# Patient Record
Sex: MALE | Race: WHITE | Employment: OTHER | URBAN - METROPOLITAN AREA
[De-identification: names, ages, dates, MRNs, and addresses within clinical notes are randomized per-mention and may not be internally consistent; named-entity substitution may affect disease eponyms.]

---

## 2019-12-03 ENCOUNTER — OFFICE VISIT (OUTPATIENT)
Dept: ORTHOPEDIC SURGERY | Age: 74
End: 2019-12-03
Payer: MEDICARE

## 2019-12-03 VITALS
SYSTOLIC BLOOD PRESSURE: 123 MMHG | WEIGHT: 180 LBS | HEIGHT: 72 IN | HEART RATE: 72 BPM | DIASTOLIC BLOOD PRESSURE: 86 MMHG | BODY MASS INDEX: 24.38 KG/M2

## 2019-12-03 DIAGNOSIS — M25.562 PAIN IN BOTH KNEES, UNSPECIFIED CHRONICITY: ICD-10-CM

## 2019-12-03 DIAGNOSIS — M17.0 BILATERAL PRIMARY OSTEOARTHRITIS OF KNEE: Primary | ICD-10-CM

## 2019-12-03 DIAGNOSIS — M25.561 PAIN IN BOTH KNEES, UNSPECIFIED CHRONICITY: ICD-10-CM

## 2019-12-03 PROCEDURE — 99204 OFFICE O/P NEW MOD 45 MIN: CPT | Performed by: ORTHOPAEDIC SURGERY

## 2019-12-03 SDOH — HEALTH STABILITY: MENTAL HEALTH: HOW OFTEN DO YOU HAVE A DRINK CONTAINING ALCOHOL?: NEVER

## 2019-12-03 ASSESSMENT — ENCOUNTER SYMPTOMS
ALLERGIC/IMMUNOLOGIC NEGATIVE: 1
RESPIRATORY NEGATIVE: 1
GASTROINTESTINAL NEGATIVE: 1
EYES NEGATIVE: 1

## 2019-12-05 PROBLEM — M17.0 BILATERAL PRIMARY OSTEOARTHRITIS OF KNEE: Status: ACTIVE | Noted: 2019-12-05

## 2019-12-16 DIAGNOSIS — M17.12 PRIMARY OSTEOARTHRITIS OF LEFT KNEE: Primary | ICD-10-CM

## 2019-12-19 ENCOUNTER — TELEPHONE (OUTPATIENT)
Dept: ORTHOPEDIC SURGERY | Age: 74
End: 2019-12-19

## 2019-12-20 ENCOUNTER — HOSPITAL ENCOUNTER (OUTPATIENT)
Dept: GENERAL RADIOLOGY | Age: 74
Discharge: HOME | End: 2019-12-20
Payer: MEDICARE

## 2019-12-20 ENCOUNTER — HOSPITAL ENCOUNTER (OUTPATIENT)
Age: 74
Discharge: HOME | End: 2019-12-20
Payer: MEDICARE

## 2019-12-20 DIAGNOSIS — M17.12 PRIMARY OSTEOARTHRITIS OF LEFT KNEE: ICD-10-CM

## 2019-12-20 PROCEDURE — 77073 BONE LENGTH STUDIES: CPT

## 2019-12-30 NOTE — PROGRESS NOTES
effect during this procedure. I give my doctor permission to give me blood or blood products. I understand that there are risks with receiving blood such as hepatitis, AIDS, fever, or allergic reaction. I acknowledge that the risks, benefits, and alternatives of this treatment have been explained to me and that no express or implied warranty has been given by the hospital, any blood bank, or any person or entity as to the blood or blood components transfused. At the discretion of my doctor, I agree to allow observers, equipment/product representatives and allow photographing, and/or televising of the procedure, provided my name or identity is maintained confidentially. I agree the hospital may dispose of or use for scientific or educational purposes any tissue, fluid, or body parts which may be removed.     ________________________________Date________Time______ am/pm  ([facility])  Patient or Signature of Closest Relative or Legal Guardian    ________________________________Date________Time______am/pm      Page 1 of  1  Witness

## 2020-01-10 NOTE — PROGRESS NOTES
procedure. 13. Bring cases for your glasses, contacts, dentures, or hearing aids to protect them while you are in surgery. 16. If you use a CPAP, please bring it with you on the day of your procedure. 17. Do not shave or wax for 72 hours prior to procedure near your operative site  18. FOR WOMAN OF CHILDBEARING AGE ONLY- please bring a urine sample with you on day of surgery or make sure we can collect on arrival.    If you have further questions, you may contact your surgeon's office or us at [phone]    Left instructions on patient's voicemail. Mis Harris. 1/10/2020 .2:21 PM

## 2020-01-13 RX ORDER — COLLAGENASE CLOSTRIDIUM HIST.
POWDER (EA) MISCELLANEOUS DAILY
COMMUNITY

## 2020-01-14 ENCOUNTER — HOSPITAL ENCOUNTER (OUTPATIENT)
Dept: PHYSICAL THERAPY | Age: 75
Setting detail: THERAPIES SERIES
Discharge: HOME | End: 2020-01-14
Payer: MEDICARE

## 2020-01-14 ENCOUNTER — OFFICE VISIT (OUTPATIENT)
Dept: ORTHOPEDIC SURGERY | Age: 75
End: 2020-01-14

## 2020-01-14 VITALS
DIASTOLIC BLOOD PRESSURE: 82 MMHG | HEART RATE: 57 BPM | WEIGHT: 179.9 LBS | SYSTOLIC BLOOD PRESSURE: 131 MMHG | HEIGHT: 73 IN | BODY MASS INDEX: 23.84 KG/M2

## 2020-01-14 PROCEDURE — 1036F TOBACCO NON-USER: CPT | Performed by: ORTHOPAEDIC SURGERY

## 2020-01-14 PROCEDURE — 99213 OFFICE O/P EST LOW 20 MIN: CPT | Performed by: ORTHOPAEDIC SURGERY

## 2020-01-14 PROCEDURE — G8427 DOCREV CUR MEDS BY ELIG CLIN: HCPCS | Performed by: ORTHOPAEDIC SURGERY

## 2020-01-14 PROCEDURE — MISC250 COMPRESSION STOCKING: Performed by: ORTHOPAEDIC SURGERY

## 2020-01-14 PROCEDURE — 97162 PT EVAL MOD COMPLEX 30 MIN: CPT

## 2020-01-14 PROCEDURE — 97110 THERAPEUTIC EXERCISES: CPT

## 2020-01-14 PROCEDURE — G8420 CALC BMI NORM PARAMETERS: HCPCS | Performed by: ORTHOPAEDIC SURGERY

## 2020-01-14 PROCEDURE — 1123F ACP DISCUSS/DSCN MKR DOCD: CPT | Performed by: ORTHOPAEDIC SURGERY

## 2020-01-14 PROCEDURE — 3017F COLORECTAL CA SCREEN DOC REV: CPT | Performed by: ORTHOPAEDIC SURGERY

## 2020-01-14 PROCEDURE — 97530 THERAPEUTIC ACTIVITIES: CPT

## 2020-01-14 PROCEDURE — G8484 FLU IMMUNIZE NO ADMIN: HCPCS | Performed by: ORTHOPAEDIC SURGERY

## 2020-01-14 PROCEDURE — 4040F PNEUMOC VAC/ADMIN/RCVD: CPT | Performed by: ORTHOPAEDIC SURGERY

## 2020-01-14 NOTE — FLOWSHEET NOTE
[facility] [address] [place] [address] [facility]    Physical Therapy Daily Treatment Note  Date:  2020    Patient Name:  Welby Bloch    :  1945  MRN: [id number]  Restrictions/Precautions:    Medical/Treatment Diagnosis Information:  · Diagnosis: M17.12 (ICD-10-CM) - Primary osteoarthritis of left knee  · Treatment Diagnosis: M25.562 L Knee Pain; M25.662 L Knee Stiffness; R53.1 Weakness  · Pre-op Left TKR  · DOS: 1-15-20  · Medications: update PO  Insurance/Certification information:  PT Insurance Information: PT BENEFITS  FACILITY/ MEDICARE PRIMARY/ EFFECTIVE 16/ ACTIVE/ PAYS 80%/ NO VISIT LIMIT MED NEC/ 0 AUTH/ [employer] SECONDARY INS/ 20 PAG  Physician Information:  Referring Practitioner: Augustine Sherman  Has the plan of care been signed (Y/N):        []  Yes  [x]  No     Date of Patient follow up with Physician: 1,3,6,12 weeks PO  Patient seen in consultation with Dr. Augustine Sherman who established the initial/subsequent treatment protocol. Is this a Progress Report:     []  Yes  [x]  No     If Yes:  Date Range for reporting period:  Beginning  Ending    Progress report will be due (10 Rx or 30 days whichever is less):        Recertification will be due (POC Duration  / 90 days whichever is less): 20       Visit # Insurance Allowable Auth Required   1 MEDICARE []  Yes [x]  No      OUTCOME MEASURE DATE DEFICIT   LEFS 20 pre-op 41% Deficit        Patient given satisfaction survey?  [] Yes   [x] No       Latex Allergy:  [x]NO      []YES  Preferred Language for Healthcare:   [x]English       []other:    Pain level:  0-8/10     SUBJECTIVE:  See eval    OBJECTIVE:       20  ROM PROM AROM Overpressure Comment     L R L R L R     Flexion     115 130         Extension     +5 +5                                                20  Strength L R Comment   Quad 5 5     Hamstring 5 5     Gastroc NT NT     Hip  flexion 4+ 4+     Hip abd NT NT                       have access to gym? [] Yes  [x] No  Patient have equipment at home? [] Yes  [x] No       Patient Education:  1/14/20: Educated patient on all pre-op and post-op instructions including but not limited to R.I.C.E., post-op HEP, DVT prevention, warning signs of infection and DVT, activity limitations. HEP:  Patient instructed on HEP on this date with handout provided and all questions answered. Patient was instructed to contact PT with any complications or questions about HEP moving forward. Patient verbally stated he understood. Updated 1/14/20    Therapeutic Exercise and NMR EXR  [x] (97826) Provided verbal/tactile cueing for activities related to strengthening, flexibility, endurance, ROM for improvements in LE, proximal hip, and core control with self care, mobility, lifting, ambulation. [x] (56503) Provided verbal/tactile cueing for activities related to improving balance, coordination, kinesthetic sense, posture, motor skill, proprioception  to assist with LE, proximal hip, and core control in self care, mobility, lifting, ambulation and eccentric single leg control.      NMR and Therapeutic Activities:    [x] (81056 or 05526) Provided verbal/tactile cueing for activities related to improving balance, coordination, kinesthetic sense, posture, motor skill, proprioception and motor activation to allow for proper function of core, proximal hip and LE with self care and ADLs  [] (90163) Gait Re-education- Provided training and instruction to the patient for proper LE, core and proximal hip recruitment and positioning and eccentric body weight control with ambulation re-education including up and down stairs     Home Exercise Program:    [x] (24458) Reviewed/Progressed HEP activities related to strengthening, flexibility, endurance, ROM of core, proximal hip and LE for functional self-care, mobility, lifting and ambulation/stair navigation   [] (79038)Reviewed/Progressed HEP activities related to improving balance, coordination, kinesthetic sense, posture, motor skill, proprioception of core, proximal hip and LE for self care, mobility, lifting, and ambulation/stair navigation      Manual Treatments:  PROM / STM / Oscillations-Mobs:  G-I, II, III, IV (PA's, Inf., Post.)  [x] (71438) Provided manual therapy to mobilize LE, proximal hip and/or LS spine soft tissue/joints for the purpose of modulating pain, promoting relaxation,  increasing ROM, reducing/eliminating soft tissue swelling/inflammation/restriction, improving soft tissue extensibility and allowing for proper ROM for normal function with self care, mobility, lifting and ambulation. Modalities:  N/A    Charges:  Timed Code Treatment Minutes: 25'   Total Treatment Minutes: 61'       [] EVAL (LOW) 455 1011 (typically 20 minutes face-to-face)  [x] EVAL (MOD) 77656 (typically 30 minutes face-to-face)  [] EVAL (HIGH) 98624 (typically 45 minutes face-to-face)  [] RE-EVAL   [x] HW(23149) x 1    [] IONTO  [] NMR (36031) x     [] VASO  [] Manual (62351) x      [] Other:  [x] TA x 1     [] Mech Traction (28904)  [] ES(attended) (24611)      [] ES (un) (00516):     GOALS:   Patient stated goal: Reduce pain in knee with ADL's    [] Progressing: [] Met: [] Not Met: [] Adjusted    Therapist goals for Patient:   Short Term Goals: To be achieved in: 2 weeks  1. Independent in HEP and progression per patient tolerance, in order to prevent re-injury. [] Progressing: [] Met: [] Not Met: [] Adjusted   2. Patient will have a decrease in pain to facilitate improvement in movement, function, and ADLs as indicated by Functional Deficits. [] Progressing: [] Met: [] Not Met: [] Adjusted    Long Term Goals: To be achieved in: 12 weeks  1. Disability index score of 30% or less for the LEFS to assist with reaching prior level of function. [] Progressing: [] Met: [] Not Met: [] Adjusted  2.  Patient will demonstrate increased AROM to 0-120 or greater to allow for proper joint functioning

## 2020-01-14 NOTE — PLAN OF CARE
is affecting his quality of life. 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 Pain is located along medial and lateral joint lines and is sharp in nature. Lives ~3 hours away in [place].      (-) clicking/popping/crepitus  (-) swelling  (-) Stiffness  (-) numbness and tingling  (+) giving way- feels like \"it wants to hyperextend\"  (-) locking up  (-) night pain  (+) pain with stairs ascending/descending  (-) Difficulty with balance  (-) Unexplained weight loss      Relevant Medical History:x2 previous left knee surgeries  Functional Disability Index:   OUTCOME MEASURE DATE DEFICIT   LEFS 1/14/20 Pre-op 41% Deficit           Pain Scale: 0/10 at rest,  7-8/10 at worst  Easing factors: Stopping / modifying activity;   Provocative factors: Lying on his side at night, walking downhill or on an incline, getting in/out of car    Type: []Constant   [x]Intermittent  []Radiating [x]Localized []other:     Numbness/Tingling: Denies    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    Living Status/Prior Level of Function: Independent with ADLs and IADLs;     OBJECTIVE:      1/14/20  ROM PROM AROM Overpressure Comment    L R L R L R    Flexion   115 130      Extension   +5 +5                            1/14/20  Strength L R Comment   Quad 5 5    Hamstring 5 5    Gastroc NT NT    Hip  flexion 4+ 4+    Hip abd NT NT                  1/14/20  Special Test Results/Comment   Homans Neg   Temperature Update PO     1/14/20  Girth L R   Mid Patella 39.2 37.0   Suprapatellar 37.5 37.5   5cm above NT - clothing NT - clothing   15cm above       Reflexes/Sensation:    [x]Dermatomes/Myotomes intact    [x]Reflexes equal and normal bilaterally   []Other:    Joint mobility:     [x]Normal    []Hypo   []Hyper    Palpation: No TTP; noticeable bone spur palpable around MJL    Functional Mobility/Transfers: Ind    Posture: Varus    Bandages/Dressings/Incisions: update PO    Gait: (include devices/WB status) Mild antalgic on however, he does live/work on a farm which may provide limitations during his recovery    Falls Risk Assessment (30 days):   [x] Falls Risk assessed and no intervention required. [] Falls Risk assessed and Patient requires intervention due to being higher risk   TUG score (>12s at risk):     [] Falls education provided, including       G-Codes:     OUTCOME MEASURE DATE DEFICIT   LEFS 1/14/20 Pre-op 41% Deficit          ASSESSMENT:   Functional Impairments:     []Noted lumbar/proximal hip/LE hypomobility   [x]Decreased LE functional ROM   [x]Decreased core/proximal hip strength and neuromuscular control   [x]Decreased LE functional strength   [x]Reduced balance/proprioceptive control   []other:      Functional Activity Limitations (from functional questionnaire and intake)   [x]Reduced ability to tolerate prolonged functional positions   [x]Reduced ability or difficulty with changes of positions or transfers between positions   [x]Reduced ability to maintain good posture and demonstrate good body mechanics with sitting, bending, and lifting   [x]Reduced ability to sleep   [x] Reduced ability or tolerance with driving and/or computer work   [x]Reduced ability to perform lifting, carrying tasks   [x]Reduced ability to squat   []Reduced ability to forward bend   [x]Reduced ability to ambulate prolonged functional periods/distances/surfaces   [x]Reduced ability to ascend/descend stairs   [x]Reduced ability to run, hop or jump   []other:     Participation Restrictions   [x]Reduced participation in self care activities   [x]Reduced participation in home management activities   [x]Reduced participation in work activities   []Reduced participation in social activities. []Reduced participation in sport activities. Classification :    [x]Signs/symptoms consistent with post-surgical status including decreased ROM, strength and function.  - will be PO at NPV   []Signs/symptoms consistent with joint sprain/strain   []Signs/symptoms consistent with patella-femoral syndrome   [x]Signs/symptoms consistent with knee OA/hip OA   []Signs/symptoms consistent with internal derangement of knee/Hip   []Signs/symptoms consistent with functional hip weakness/NMR control      []Signs/symptoms consistent with tendinitis/tendinosis    []signs/symptoms consistent with pathology which may benefit from Dry needling      []other:      Prognosis/Rehab Potential:      []Excellent   [x]Good    []Fair   []Poor    Tolerance of evaluation/treatment:    []Excellent   [x]Good    []Fair   []Poor    Physical Therapy Evaluation Complexity Justification  [x] A history of present problem with:  [] no personal factors and/or comorbidities that impact the plan of care;  [x]1-2 personal factors and/or comorbidities that impact the plan of care  []3 personal factors and/or comorbidities that impact the plan of care  [x] An examination of body systems using standardized tests and measures addressing any of the following: body structures and functions (impairments), activity limitations, and/or participation restrictions;:  [] a total of 1-2 or more elements   [] a total of 3 or more elements   [x] a total of 4 or more elements   [x] A clinical presentation with:  [] stable and/or uncomplicated characteristics   [x] evolving clinical presentation with changing characteristics  [] unstable and unpredictable characteristics;   [x] Clinical decision making of [] low, [x] moderate, [] high complexity using standardized patient assessment instrument and/or measurable assessment of functional outcome.     [] EVAL (LOW) 41291 (typically 20 minutes face-to-face)  [x] EVAL (MOD) 99257 (typically 30 minutes face-to-face)  [] EVAL (HIGH) 37683 (typically 45 minutes face-to-face)  [] RE-EVAL       PLAN  Frequency/Duration:  2 days per week for 12 Weeks:  Interventions:  [x]  Therapeutic exercise including: strength training, ROM, for Lower extremity and core [x]  NMR activation and proprioception for LE, Glutes and Core   [x]  Manual therapy as indicated for LE, Hip and spine to include: Dry Needling/IASTM, STM, PROM, Gr I-IV mobilizations, manipulation. [x] Modalities as needed that may include: thermal agents, E-stim, Biofeedback, US, iontophoresis as indicated  [x] Patient education on joint protection, postural re-education, activity modification, progression of HEP. HEP instruction:  Patient instructed on HEP on this date with handout provided and all questions answered. Patient was instructed to contact PT with any complications or questions about HEP moving forward. Patient verbally stated he understood. (see scanned forms)    GOALS:   Patient stated goal: Reduce pain in knee with ADL's    [] Progressing: [] Met: [] Not Met: [] Adjusted    Therapist goals for Patient:   Short Term Goals: To be achieved in: 2 weeks  1. Independent in HEP and progression per patient tolerance, in order to prevent re-injury. [] Progressing: [] Met: [] Not Met: [] Adjusted   2. Patient will have a decrease in pain to facilitate improvement in movement, function, and ADLs as indicated by Functional Deficits. [] Progressing: [] Met: [] Not Met: [] Adjusted    Long Term Goals: To be achieved in: 12 weeks  1. Disability index score of 30% or less for the LEFS to assist with reaching prior level of function. [] Progressing: [] Met: [] Not Met: [] Adjusted  2. Patient will demonstrate increased AROM to 0-120 or greater to allow for proper joint functioning as indicated by patients Functional Deficits. [] Progressing: [] Met: [] Not Met: [] Adjusted  3. Patient will demonstrate an increase in Strength to 5/5 in BLE to allow for proper functional mobility as indicated by patients Functional Deficits. [] Progressing: [] Met: [] Not Met: [] Adjusted  4. Patient will return to all ADL and other functional activities without increased symptoms or restriction.    [] Progressing: [] Met: [] Not Met: [] Adjusted  5. Patient will ascend/descend one flight of stairs without increased pain in left knee. (patient specific functional goal)    [] Progressing: [] Met: [] Not Met: [] Adjusted       Electronically signed by:  Nahum Armenta, PT, DPT    Note: If patient does not return for scheduled/ recommended follow up visits, this note will serve as a discharge from care along with most recent update on progress.

## 2020-01-15 ENCOUNTER — ANESTHESIA EVENT (OUTPATIENT)
Dept: OPERATING ROOM | Age: 75
End: 2020-01-15
Payer: MEDICARE

## 2020-01-15 ENCOUNTER — HOSPITAL ENCOUNTER (OUTPATIENT)
Age: 75
Discharge: HOME | End: 2020-01-16
Attending: ORTHOPAEDIC SURGERY | Admitting: ORTHOPAEDIC SURGERY
Payer: MEDICARE

## 2020-01-15 ENCOUNTER — ANESTHESIA (OUTPATIENT)
Dept: OPERATING ROOM | Age: 75
End: 2020-01-15
Payer: MEDICARE

## 2020-01-15 VITALS
OXYGEN SATURATION: 99 % | TEMPERATURE: 97.5 F | RESPIRATION RATE: 16 BRPM | SYSTOLIC BLOOD PRESSURE: 149 MMHG | DIASTOLIC BLOOD PRESSURE: 70 MMHG

## 2020-01-15 PROBLEM — Z96.652 S/P TOTAL KNEE REPLACEMENT, LEFT: Status: ACTIVE | Noted: 2020-01-15

## 2020-01-15 LAB
ALBUMIN SERPL-MCNC: 4.1 G/DL (ref 3.4–5)
ALP BLD-CCNC: 88 U/L (ref 40–129)
ALT SERPL-CCNC: 19 U/L (ref 10–40)
APTT: 30.2 SEC (ref 24.2–36.2)
AST SERPL-CCNC: 20 U/L (ref 15–37)
BILIRUB SERPL-MCNC: 0.6 MG/DL (ref 0–1)
BILIRUBIN DIRECT: <0.2 MG/DL (ref 0–0.3)
BILIRUBIN, INDIRECT: NORMAL MG/DL (ref 0–1)
CREAT SERPL-MCNC: 1.2 MG/DL (ref 0.8–1.3)
GFR AFRICAN AMERICAN: >60
GFR NON-AFRICAN AMERICAN: 59
INR BLD: 0.94 (ref 0.86–1.14)
PROTHROMBIN TIME: 10.9 SEC (ref 10–13.2)
TOTAL PROTEIN: 7.1 G/DL (ref 6.4–8.2)

## 2020-01-15 PROCEDURE — 6360000002 HC RX W HCPCS: Performed by: NURSE ANESTHETIST, CERTIFIED REGISTERED

## 2020-01-15 PROCEDURE — 2580000003 HC RX 258: Performed by: ORTHOPAEDIC SURGERY

## 2020-01-15 PROCEDURE — 2500000003 HC RX 250 WO HCPCS: Performed by: NURSE ANESTHETIST, CERTIFIED REGISTERED

## 2020-01-15 PROCEDURE — 82565 ASSAY OF CREATININE: CPT

## 2020-01-15 PROCEDURE — 7100000001 HC PACU RECOVERY - ADDTL 15 MIN: Performed by: ORTHOPAEDIC SURGERY

## 2020-01-15 PROCEDURE — 7100000000 HC PACU RECOVERY - FIRST 15 MIN: Performed by: ORTHOPAEDIC SURGERY

## 2020-01-15 PROCEDURE — 2709999900 HC NON-CHARGEABLE SUPPLY: Performed by: ORTHOPAEDIC SURGERY

## 2020-01-15 PROCEDURE — 2700000000 HC OXYGEN THERAPY PER DAY

## 2020-01-15 PROCEDURE — 2720000010 HC SURG SUPPLY STERILE: Performed by: ORTHOPAEDIC SURGERY

## 2020-01-15 PROCEDURE — 94761 N-INVAS EAR/PLS OXIMETRY MLT: CPT

## 2020-01-15 PROCEDURE — 6360000002 HC RX W HCPCS: Performed by: ORTHOPAEDIC SURGERY

## 2020-01-15 PROCEDURE — C1776 JOINT DEVICE (IMPLANTABLE): HCPCS | Performed by: ORTHOPAEDIC SURGERY

## 2020-01-15 PROCEDURE — 3600000005 HC SURGERY LEVEL 5 BASE: Performed by: ORTHOPAEDIC SURGERY

## 2020-01-15 PROCEDURE — 36415 COLL VENOUS BLD VENIPUNCTURE: CPT

## 2020-01-15 PROCEDURE — 6370000000 HC RX 637 (ALT 250 FOR IP): Performed by: ORTHOPAEDIC SURGERY

## 2020-01-15 PROCEDURE — 3700000001 HC ADD 15 MINUTES (ANESTHESIA): Performed by: ORTHOPAEDIC SURGERY

## 2020-01-15 PROCEDURE — 6360000002 HC RX W HCPCS: Performed by: ANESTHESIOLOGY

## 2020-01-15 PROCEDURE — 2500000003 HC RX 250 WO HCPCS: Performed by: ORTHOPAEDIC SURGERY

## 2020-01-15 PROCEDURE — 3700000000 HC ANESTHESIA ATTENDED CARE: Performed by: ORTHOPAEDIC SURGERY

## 2020-01-15 PROCEDURE — 85610 PROTHROMBIN TIME: CPT

## 2020-01-15 PROCEDURE — 85730 THROMBOPLASTIN TIME PARTIAL: CPT

## 2020-01-15 PROCEDURE — 80076 HEPATIC FUNCTION PANEL: CPT

## 2020-01-15 PROCEDURE — 94150 VITAL CAPACITY TEST: CPT

## 2020-01-15 PROCEDURE — 2580000003 HC RX 258: Performed by: NURSE ANESTHETIST, CERTIFIED REGISTERED

## 2020-01-15 PROCEDURE — 94799 UNLISTED PULMONARY SVC/PX: CPT

## 2020-01-15 PROCEDURE — 3600000015 HC SURGERY LEVEL 5 ADDTL 15MIN: Performed by: ORTHOPAEDIC SURGERY

## 2020-01-15 PROCEDURE — L3650 SO 8 ABD RESTRAINT PRE OTS: HCPCS | Performed by: ORTHOPAEDIC SURGERY

## 2020-01-15 PROCEDURE — C1713 ANCHOR/SCREW BN/BN,TIS/BN: HCPCS | Performed by: ORTHOPAEDIC SURGERY

## 2020-01-15 DEVICE — JOURNEY II BCS FEMORAL OXINIUM                                    LEFT SIZE 7
Type: IMPLANTABLE DEVICE | Site: KNEE | Status: FUNCTIONAL
Brand: JOURNEY

## 2020-01-15 DEVICE — JOURNEY TIBIAL BASEPLATE NONPOROUS                                    LEFT SIZE 6
Type: IMPLANTABLE DEVICE | Site: KNEE | Status: FUNCTIONAL
Brand: JOURNEY

## 2020-01-15 DEVICE — COMPONENT TOT KNEE CAPPED ADV OXIN NP JOURNEY II: Type: IMPLANTABLE DEVICE | Site: KNEE | Status: FUNCTIONAL

## 2020-01-15 DEVICE — TABLE FIXATION STAPLE SMALL 13MM                                    WIDE X 16MM LONG: Type: IMPLANTABLE DEVICE | Site: KNEE | Status: FUNCTIONAL

## 2020-01-15 DEVICE — NON RIMMED SPEED PIN 65MM STERILE: Type: IMPLANTABLE DEVICE | Site: KNEE | Status: FUNCTIONAL

## 2020-01-15 DEVICE — CEMENT BNE 20ML 40GM FULL DOSE PMMA W/O ANTIBIO M VISC: Type: IMPLANTABLE DEVICE | Site: KNEE | Status: FUNCTIONAL

## 2020-01-15 DEVICE — JOURNEY BCS PATELLA  RESURFACING                                    ROUND 35 MM  STD
Type: IMPLANTABLE DEVICE | Site: KNEE | Status: FUNCTIONAL
Brand: JOURNEY

## 2020-01-15 RX ORDER — PROPOFOL 10 MG/ML
INJECTION, EMULSION INTRAVENOUS PRN
Status: DISCONTINUED | OUTPATIENT
Start: 2020-01-15 | End: 2020-01-15 | Stop reason: SDUPTHER

## 2020-01-15 RX ORDER — DEXAMETHASONE SODIUM PHOSPHATE 4 MG/ML
INJECTION, SOLUTION INTRA-ARTICULAR; INTRALESIONAL; INTRAMUSCULAR; INTRAVENOUS; SOFT TISSUE PRN
Status: DISCONTINUED | OUTPATIENT
Start: 2020-01-15 | End: 2020-01-15 | Stop reason: SDUPTHER

## 2020-01-15 RX ORDER — OXYCODONE HYDROCHLORIDE 5 MG/1
5 TABLET ORAL EVERY 4 HOURS PRN
Status: DISCONTINUED | OUTPATIENT
Start: 2020-01-15 | End: 2020-01-16 | Stop reason: HOSPADM

## 2020-01-15 RX ORDER — ONDANSETRON 2 MG/ML
INJECTION INTRAMUSCULAR; INTRAVENOUS PRN
Status: DISCONTINUED | OUTPATIENT
Start: 2020-01-15 | End: 2020-01-15 | Stop reason: SDUPTHER

## 2020-01-15 RX ORDER — TETRACAINE HYDROCHLORIDE 5 MG/ML
1 SOLUTION OPHTHALMIC ONCE
Status: DISCONTINUED | OUTPATIENT
Start: 2020-01-15 | End: 2020-01-15 | Stop reason: HOSPADM

## 2020-01-15 RX ORDER — ASPIRIN 81 MG/1
81 TABLET ORAL 2 TIMES DAILY
Status: DISCONTINUED | OUTPATIENT
Start: 2020-01-16 | End: 2020-01-16 | Stop reason: HOSPADM

## 2020-01-15 RX ORDER — GLYCOPYRROLATE 1 MG/5 ML
SYRINGE (ML) INTRAVENOUS PRN
Status: DISCONTINUED | OUTPATIENT
Start: 2020-01-15 | End: 2020-01-15 | Stop reason: SDUPTHER

## 2020-01-15 RX ORDER — HYDROCODONE BITARTRATE AND ACETAMINOPHEN 5; 325 MG/1; MG/1
1 TABLET ORAL
Status: DISCONTINUED | OUTPATIENT
Start: 2020-01-15 | End: 2020-01-15 | Stop reason: HOSPADM

## 2020-01-15 RX ORDER — ONDANSETRON 2 MG/ML
4 INJECTION INTRAMUSCULAR; INTRAVENOUS EVERY 6 HOURS PRN
Status: DISCONTINUED | OUTPATIENT
Start: 2020-01-15 | End: 2020-01-16 | Stop reason: HOSPADM

## 2020-01-15 RX ORDER — DIPHENHYDRAMINE HYDROCHLORIDE 50 MG/ML
12.5 INJECTION INTRAMUSCULAR; INTRAVENOUS
Status: DISCONTINUED | OUTPATIENT
Start: 2020-01-15 | End: 2020-01-15 | Stop reason: HOSPADM

## 2020-01-15 RX ORDER — TAMSULOSIN HYDROCHLORIDE 0.4 MG/1
0.4 CAPSULE ORAL DAILY
Status: DISCONTINUED | OUTPATIENT
Start: 2020-01-15 | End: 2020-01-16 | Stop reason: HOSPADM

## 2020-01-15 RX ORDER — BALANCED SALT SOLUTION ENRICHED WITH BICARBONATE, DEXTROSE, AND GLUTATHIONE
KIT INTRAOCULAR ONCE
Status: DISCONTINUED | OUTPATIENT
Start: 2020-01-15 | End: 2020-01-15 | Stop reason: HOSPADM

## 2020-01-15 RX ORDER — FENTANYL CITRATE 50 UG/ML
INJECTION, SOLUTION INTRAMUSCULAR; INTRAVENOUS PRN
Status: DISCONTINUED | OUTPATIENT
Start: 2020-01-15 | End: 2020-01-15 | Stop reason: SDUPTHER

## 2020-01-15 RX ORDER — VANCOMYCIN HYDROCHLORIDE 500 MG/10ML
INJECTION, POWDER, LYOPHILIZED, FOR SOLUTION INTRAVENOUS PRN
Status: DISCONTINUED | OUTPATIENT
Start: 2020-01-15 | End: 2020-01-15 | Stop reason: ALTCHOICE

## 2020-01-15 RX ORDER — ACETAMINOPHEN 325 MG/1
650 TABLET ORAL EVERY 6 HOURS
Status: DISCONTINUED | OUTPATIENT
Start: 2020-01-15 | End: 2020-01-16 | Stop reason: HOSPADM

## 2020-01-15 RX ORDER — DEXAMETHASONE SODIUM PHOSPHATE 4 MG/ML
3 INJECTION, SOLUTION INTRA-ARTICULAR; INTRALESIONAL; INTRAMUSCULAR; INTRAVENOUS; SOFT TISSUE EVERY 12 HOURS
Status: DISCONTINUED | OUTPATIENT
Start: 2020-01-16 | End: 2020-01-16 | Stop reason: HOSPADM

## 2020-01-15 RX ORDER — DEXAMETHASONE SODIUM PHOSPHATE 4 MG/ML
6 INJECTION, SOLUTION INTRA-ARTICULAR; INTRALESIONAL; INTRAMUSCULAR; INTRAVENOUS; SOFT TISSUE ONCE
Status: COMPLETED | OUTPATIENT
Start: 2020-01-15 | End: 2020-01-15

## 2020-01-15 RX ORDER — OXYCODONE HYDROCHLORIDE AND ACETAMINOPHEN 5; 325 MG/1; MG/1
1 TABLET ORAL EVERY 6 HOURS PRN
Qty: 28 TABLET | Refills: 0 | Status: SHIPPED | OUTPATIENT
Start: 2020-01-15 | End: 2020-01-22

## 2020-01-15 RX ORDER — TRANEXAMIC ACID 100 MG/ML
INJECTION, SOLUTION INTRAVENOUS PRN
Status: DISCONTINUED | OUTPATIENT
Start: 2020-01-15 | End: 2020-01-15 | Stop reason: ALTCHOICE

## 2020-01-15 RX ORDER — NEOSTIGMINE METHYLSULFATE 5 MG/5 ML
SYRINGE (ML) INTRAVENOUS PRN
Status: DISCONTINUED | OUTPATIENT
Start: 2020-01-15 | End: 2020-01-15 | Stop reason: SDUPTHER

## 2020-01-15 RX ORDER — SODIUM CHLORIDE 0.9 % (FLUSH) 0.9 %
10 SYRINGE (ML) INJECTION PRN
Status: DISCONTINUED | OUTPATIENT
Start: 2020-01-15 | End: 2020-01-16 | Stop reason: HOSPADM

## 2020-01-15 RX ORDER — SODIUM CHLORIDE 0.9 % (FLUSH) 0.9 %
10 SYRINGE (ML) INJECTION EVERY 12 HOURS SCHEDULED
Status: DISCONTINUED | OUTPATIENT
Start: 2020-01-15 | End: 2020-01-16 | Stop reason: HOSPADM

## 2020-01-15 RX ORDER — HYDRALAZINE HYDROCHLORIDE 20 MG/ML
5 INJECTION INTRAMUSCULAR; INTRAVENOUS EVERY 10 MIN PRN
Status: DISCONTINUED | OUTPATIENT
Start: 2020-01-15 | End: 2020-01-15 | Stop reason: HOSPADM

## 2020-01-15 RX ORDER — LIDOCAINE HYDROCHLORIDE 20 MG/ML
INJECTION, SOLUTION INTRAVENOUS PRN
Status: DISCONTINUED | OUTPATIENT
Start: 2020-01-15 | End: 2020-01-15 | Stop reason: SDUPTHER

## 2020-01-15 RX ORDER — DOCUSATE SODIUM 100 MG/1
100 CAPSULE, LIQUID FILLED ORAL 2 TIMES DAILY
Status: DISCONTINUED | OUTPATIENT
Start: 2020-01-15 | End: 2020-01-16 | Stop reason: HOSPADM

## 2020-01-15 RX ORDER — MIDAZOLAM HYDROCHLORIDE 1 MG/ML
INJECTION INTRAMUSCULAR; INTRAVENOUS PRN
Status: DISCONTINUED | OUTPATIENT
Start: 2020-01-15 | End: 2020-01-15 | Stop reason: SDUPTHER

## 2020-01-15 RX ORDER — 0.9 % SODIUM CHLORIDE 0.9 %
500 INTRAVENOUS SOLUTION INTRAVENOUS
Status: DISCONTINUED | OUTPATIENT
Start: 2020-01-15 | End: 2020-01-15 | Stop reason: HOSPADM

## 2020-01-15 RX ORDER — SODIUM CHLORIDE 9 MG/ML
INJECTION, SOLUTION INTRAVENOUS CONTINUOUS
Status: DISCONTINUED | OUTPATIENT
Start: 2020-01-15 | End: 2020-01-16 | Stop reason: HOSPADM

## 2020-01-15 RX ORDER — ASPIRIN 81 MG/1
81 TABLET ORAL 2 TIMES DAILY
Qty: 56 TABLET | Refills: 0 | Status: SHIPPED | OUTPATIENT
Start: 2020-01-15 | End: 2020-06-04 | Stop reason: CLARIF

## 2020-01-15 RX ORDER — SODIUM CHLORIDE, SODIUM LACTATE, POTASSIUM CHLORIDE, CALCIUM CHLORIDE 600; 310; 30; 20 MG/100ML; MG/100ML; MG/100ML; MG/100ML
INJECTION, SOLUTION INTRAVENOUS CONTINUOUS PRN
Status: DISCONTINUED | OUTPATIENT
Start: 2020-01-15 | End: 2020-01-15 | Stop reason: SDUPTHER

## 2020-01-15 RX ORDER — ROCURONIUM BROMIDE 10 MG/ML
INJECTION, SOLUTION INTRAVENOUS PRN
Status: DISCONTINUED | OUTPATIENT
Start: 2020-01-15 | End: 2020-01-15 | Stop reason: SDUPTHER

## 2020-01-15 RX ORDER — POLYETHYLENE GLYCOL 3350 17 G/17G
17 POWDER, FOR SOLUTION ORAL DAILY
Qty: 510 G | Refills: 0 | COMMUNITY
Start: 2020-01-15 | End: 2020-02-14

## 2020-01-15 RX ORDER — MULTIVITAMIN WITH FOLIC ACID 400 MCG
1 TABLET ORAL DAILY
Status: DISCONTINUED | OUTPATIENT
Start: 2020-01-15 | End: 2020-01-16 | Stop reason: HOSPADM

## 2020-01-15 RX ORDER — SENNA AND DOCUSATE SODIUM 50; 8.6 MG/1; MG/1
1 TABLET, FILM COATED ORAL 2 TIMES DAILY
Status: DISCONTINUED | OUTPATIENT
Start: 2020-01-15 | End: 2020-01-16 | Stop reason: HOSPADM

## 2020-01-15 RX ORDER — PROCHLORPERAZINE EDISYLATE 5 MG/ML
5 INJECTION INTRAMUSCULAR; INTRAVENOUS
Status: DISCONTINUED | OUTPATIENT
Start: 2020-01-15 | End: 2020-01-15 | Stop reason: HOSPADM

## 2020-01-15 RX ORDER — ONDANSETRON 2 MG/ML
4 INJECTION INTRAMUSCULAR; INTRAVENOUS
Status: DISCONTINUED | OUTPATIENT
Start: 2020-01-15 | End: 2020-01-15 | Stop reason: HOSPADM

## 2020-01-15 RX ORDER — OXYCODONE HYDROCHLORIDE 5 MG/1
10 TABLET ORAL EVERY 4 HOURS PRN
Status: DISCONTINUED | OUTPATIENT
Start: 2020-01-15 | End: 2020-01-16 | Stop reason: HOSPADM

## 2020-01-15 RX ORDER — MEPERIDINE HYDROCHLORIDE 25 MG/ML
12.5 INJECTION INTRAMUSCULAR; INTRAVENOUS; SUBCUTANEOUS EVERY 5 MIN PRN
Status: DISCONTINUED | OUTPATIENT
Start: 2020-01-15 | End: 2020-01-15 | Stop reason: HOSPADM

## 2020-01-15 RX ADMIN — SODIUM CHLORIDE: 9 INJECTION, SOLUTION INTRAVENOUS at 14:20

## 2020-01-15 RX ADMIN — FENTANYL CITRATE 50 MCG: 50 INJECTION INTRAMUSCULAR; INTRAVENOUS at 10:04

## 2020-01-15 RX ADMIN — FENTANYL CITRATE 100 MCG: 50 INJECTION INTRAMUSCULAR; INTRAVENOUS at 09:35

## 2020-01-15 RX ADMIN — SODIUM CHLORIDE, SODIUM LACTATE, POTASSIUM CHLORIDE, AND CALCIUM CHLORIDE: 600; 310; 30; 20 INJECTION, SOLUTION INTRAVENOUS at 09:28

## 2020-01-15 RX ADMIN — DOCUSATE SODIUM 100 MG: 100 CAPSULE, LIQUID FILLED ORAL at 21:15

## 2020-01-15 RX ADMIN — OXYCODONE 10 MG: 5 TABLET ORAL at 22:50

## 2020-01-15 RX ADMIN — Medication 0.4 MG: at 11:33

## 2020-01-15 RX ADMIN — ONDANSETRON 4 MG: 2 INJECTION INTRAMUSCULAR; INTRAVENOUS at 09:58

## 2020-01-15 RX ADMIN — Medication 0.2 MG: at 10:47

## 2020-01-15 RX ADMIN — LIDOCAINE HYDROCHLORIDE 50 MG: 20 INJECTION, SOLUTION INTRAVENOUS at 09:35

## 2020-01-15 RX ADMIN — ROCURONIUM BROMIDE 40 MG: 10 INJECTION, SOLUTION INTRAVENOUS at 09:37

## 2020-01-15 RX ADMIN — HYDROMORPHONE HYDROCHLORIDE 0.25 MG: 1 INJECTION, SOLUTION INTRAMUSCULAR; INTRAVENOUS; SUBCUTANEOUS at 15:17

## 2020-01-15 RX ADMIN — CEFAZOLIN 2 G: 10 INJECTION, POWDER, FOR SOLUTION INTRAVENOUS at 19:16

## 2020-01-15 RX ADMIN — DEXAMETHASONE SODIUM PHOSPHATE 10 MG: 4 INJECTION, SOLUTION INTRAMUSCULAR; INTRAVENOUS at 09:41

## 2020-01-15 RX ADMIN — ACETAMINOPHEN 650 MG: 325 TABLET ORAL at 22:50

## 2020-01-15 RX ADMIN — TRANEXAMIC ACID 1 G: 1 INJECTION, SOLUTION INTRAVENOUS at 11:32

## 2020-01-15 RX ADMIN — SENNOSIDES AND DOCUSATE SODIUM 1 TABLET: 8.6; 5 TABLET ORAL at 21:15

## 2020-01-15 RX ADMIN — TAMSULOSIN HYDROCHLORIDE 0.4 MG: 0.4 CAPSULE ORAL at 18:12

## 2020-01-15 RX ADMIN — THERA TABS 1 TABLET: TAB at 18:12

## 2020-01-15 RX ADMIN — PHENYLEPHRINE HYDROCHLORIDE 80 MCG: 10 INJECTION, SOLUTION INTRAMUSCULAR; INTRAVENOUS; SUBCUTANEOUS at 11:44

## 2020-01-15 RX ADMIN — OXYCODONE 5 MG: 5 TABLET ORAL at 18:12

## 2020-01-15 RX ADMIN — Medication 3 MG: at 11:35

## 2020-01-15 RX ADMIN — ACETAMINOPHEN 650 MG: 325 TABLET ORAL at 18:12

## 2020-01-15 RX ADMIN — HYDROMORPHONE HYDROCHLORIDE 0.25 MG: 1 INJECTION, SOLUTION INTRAMUSCULAR; INTRAVENOUS; SUBCUTANEOUS at 15:50

## 2020-01-15 RX ADMIN — PROPOFOL 150 MG: 10 INJECTION, EMULSION INTRAVENOUS at 09:35

## 2020-01-15 RX ADMIN — CEFAZOLIN 2 G: 10 INJECTION, POWDER, FOR SOLUTION INTRAVENOUS at 09:23

## 2020-01-15 RX ADMIN — FENTANYL CITRATE 100 MCG: 50 INJECTION INTRAMUSCULAR; INTRAVENOUS at 10:27

## 2020-01-15 RX ADMIN — ROCURONIUM BROMIDE 10 MG: 10 INJECTION, SOLUTION INTRAVENOUS at 10:42

## 2020-01-15 RX ADMIN — Medication 10 ML: at 21:31

## 2020-01-15 RX ADMIN — MIDAZOLAM HYDROCHLORIDE 2 MG: 2 INJECTION, SOLUTION INTRAMUSCULAR; INTRAVENOUS at 09:24

## 2020-01-15 RX ADMIN — FENTANYL CITRATE 50 MCG: 50 INJECTION INTRAMUSCULAR; INTRAVENOUS at 09:48

## 2020-01-15 RX ADMIN — DEXAMETHASONE SODIUM PHOSPHATE 6 MG: 4 INJECTION, SOLUTION INTRAMUSCULAR; INTRAVENOUS at 21:15

## 2020-01-15 ASSESSMENT — PULMONARY FUNCTION TESTS
PIF_VALUE: 0
PIF_VALUE: 16
PIF_VALUE: 11
PIF_VALUE: 16
PIF_VALUE: 15
PIF_VALUE: 16
PIF_VALUE: 15
PIF_VALUE: 16
PIF_VALUE: 15
PIF_VALUE: 20
PIF_VALUE: 16
PIF_VALUE: 23
PIF_VALUE: 16
PIF_VALUE: 16
PIF_VALUE: 20
PIF_VALUE: 17
PIF_VALUE: 16
PIF_VALUE: 1
PIF_VALUE: 16
PIF_VALUE: 15
PIF_VALUE: 16
PIF_VALUE: 16
PIF_VALUE: 15
PIF_VALUE: 16
PIF_VALUE: 15
PIF_VALUE: 15
PIF_VALUE: 16
PIF_VALUE: 15
PIF_VALUE: 16
PIF_VALUE: 15
PIF_VALUE: 16
PIF_VALUE: 15
PIF_VALUE: 16
PIF_VALUE: 15
PIF_VALUE: 16
PIF_VALUE: 15
PIF_VALUE: 16
PIF_VALUE: 15
PIF_VALUE: 16
PIF_VALUE: 16
PIF_VALUE: 1
PIF_VALUE: 16
PIF_VALUE: 15
PIF_VALUE: 16
PIF_VALUE: 1
PIF_VALUE: 16
PIF_VALUE: 17
PIF_VALUE: 16
PIF_VALUE: 15
PIF_VALUE: 16
PIF_VALUE: 15
PIF_VALUE: 16
PIF_VALUE: 16
PIF_VALUE: 0
PIF_VALUE: 16
PIF_VALUE: 15
PIF_VALUE: 16
PIF_VALUE: 16
PIF_VALUE: 15
PIF_VALUE: 5
PIF_VALUE: 16
PIF_VALUE: 16
PIF_VALUE: 15
PIF_VALUE: 14
PIF_VALUE: 16
PIF_VALUE: 15
PIF_VALUE: 16
PIF_VALUE: 16
PIF_VALUE: 15
PIF_VALUE: 16
PIF_VALUE: 15
PIF_VALUE: 16
PIF_VALUE: 15
PIF_VALUE: 16
PIF_VALUE: 15
PIF_VALUE: 16
PIF_VALUE: 16
PIF_VALUE: 15
PIF_VALUE: 16
PIF_VALUE: 15
PIF_VALUE: 16
PIF_VALUE: 16
PIF_VALUE: 15
PIF_VALUE: 16
PIF_VALUE: 15
PIF_VALUE: 16
PIF_VALUE: 0
PIF_VALUE: 2
PIF_VALUE: 16
PIF_VALUE: 15
PIF_VALUE: 16
PIF_VALUE: 16
PIF_VALUE: 17

## 2020-01-15 ASSESSMENT — PAIN DESCRIPTION - LOCATION
LOCATION: KNEE
LOCATION: KNEE
LOCATION: EYE;KNEE

## 2020-01-15 ASSESSMENT — LIFESTYLE VARIABLES: SMOKING_STATUS: 0

## 2020-01-15 ASSESSMENT — PAIN SCALES - GENERAL
PAINLEVEL_OUTOF10: 4
PAINLEVEL_OUTOF10: 5
PAINLEVEL_OUTOF10: 7
PAINLEVEL_OUTOF10: 3
PAINLEVEL_OUTOF10: 4
PAINLEVEL_OUTOF10: 0
PAINLEVEL_OUTOF10: 4

## 2020-01-15 ASSESSMENT — PAIN DESCRIPTION - ORIENTATION
ORIENTATION: LEFT
ORIENTATION: RIGHT;LEFT
ORIENTATION: LEFT

## 2020-01-15 ASSESSMENT — PAIN DESCRIPTION - PROGRESSION
CLINICAL_PROGRESSION: GRADUALLY WORSENING
CLINICAL_PROGRESSION: NOT CHANGED
CLINICAL_PROGRESSION: NOT CHANGED

## 2020-01-15 ASSESSMENT — PAIN DESCRIPTION - DESCRIPTORS
DESCRIPTORS: ACHING
DESCRIPTORS: ACHING;BURNING
DESCRIPTORS: TIGHTNESS

## 2020-01-15 ASSESSMENT — PAIN DESCRIPTION - PAIN TYPE
TYPE: SURGICAL PAIN

## 2020-01-15 ASSESSMENT — PAIN - FUNCTIONAL ASSESSMENT
PAIN_FUNCTIONAL_ASSESSMENT: PREVENTS OR INTERFERES SOME ACTIVE ACTIVITIES AND ADLS
PAIN_FUNCTIONAL_ASSESSMENT: ACTIVITIES ARE NOT PREVENTED
PAIN_FUNCTIONAL_ASSESSMENT: 0-10

## 2020-01-15 ASSESSMENT — PAIN DESCRIPTION - ONSET
ONSET: ON-GOING
ONSET: ON-GOING

## 2020-01-15 ASSESSMENT — PAIN DESCRIPTION - FREQUENCY
FREQUENCY: CONTINUOUS

## 2020-01-15 NOTE — PROGRESS NOTES
Patient's receiving nurse made aware of right eye procedure and patching. She verbalized her understanding. Patient now ready for transfer to [id number].

## 2020-01-15 NOTE — PROGRESS NOTES
Patient c/o some right eye discomfort (like sand in his eye) post anesthesia. Dr. Maddy Busch notified. Order received for irrigation of right eye with BSS. Done for patient and he has reported some relief but still wants to take his hand and press on eye despite being told repeatedly to keep his hands away from his face.

## 2020-01-15 NOTE — H&P
Tiesha Warren    [id number]    [facility] Same Day Surgery Update H & P  Department of General Surgery   Surgical Service   Pre-operative History and Physical  Last H & P within the last 30 days. DIAGNOSIS:   Primary osteoarthritis of left knee [M17.12]    PROCEDURE:  [place] TOTAL KNEE ARTHROPLASTY [16046] (LEFT TOTAL KNEE ARTHROPLASTY)     HISTORY OF PRESENT ILLNESS:    Patient with chronic left knee pain and swelling in the setting of arthrosis. The symptoms have been recalcitrant to conservative treatment and the patient presents today for the above procedure. Past Medical History:    History reviewed. No pertinent past medical history.   Past Surgical History:        Procedure Laterality Date    APPENDECTOMY      FRACTURE SURGERY      arm- has small metal plate in place     HERNIA REPAIR      inguinal     KNEE SURGERY Left     x 2 - 10 years apart     Past Social History:  Social History     Socioeconomic History    Marital status:      Spouse name: None    Number of children: None    Years of education: None    Highest education level: None   Occupational History    None   Social Needs    Financial resource strain: None    Food insecurity:     Worry: None     Inability: None    Transportation needs:     Medical: None     Non-medical: None   Tobacco Use    Smoking status: Never Smoker    Smokeless tobacco: Never Used   Substance and Sexual Activity    Alcohol use: Never     Frequency: Never    Drug use: Never    Sexual activity: None   Lifestyle    Physical activity:     Days per week: None     Minutes per session: None    Stress: None   Relationships    Social connections:     Talks on phone: None     Gets together: None     Attends Buddhism service: None     Active member of club or organization: None     Attends meetings of clubs or organizations: None     Relationship status: None    Intimate partner violence:     Fear of current or ex partner: None     Emotionally

## 2020-01-15 NOTE — ANESTHESIA PRE PROCEDURE
Department of Anesthesiology  Preprocedure Note       Name:  Joselyn Gillis   Age:  76 y.o.  :  1945                                          MRN:  [id number]         Date:  1/15/2020      Surgeon: Catherine Vickers):  Bruce Larkin MD    Procedure: LEFT TOTAL KNEE ARTHROPLASTY (Left )    Medications prior to admission:   Prior to Admission medications    Medication Sig Start Date End Date Taking?  Authorizing Provider   GLUCOSAMINE-CHONDROITIN PO Take 2 tablets by mouth daily    Historical Provider, MD   Collagenase POWD Take by mouth daily    Historical Provider, MD   Multiple Vitamins-Minerals (MULTIVITAMIN PO) Take 1 tablet by mouth daily    Historical Provider, MD       Current medications:    Current Facility-Administered Medications   Medication Dose Route Frequency Provider Last Rate Last Dose    ceFAZolin (ANCEF) 2 g in dextrose 5 % 50 mL IVPB  2 g Intravenous Once Bruce Larkin MD        tranexamic acid (CYKLOKAPRON) 1,000 mg in sodium chloride 0.9 % 50 mL IVPB  1,000 mg Intravenous Once Bruce Larkin MD        tranexamic acid (CYKLOKAPRON) irrigation 1,000 mg  1,000 mg Irrigation Once Bruce Larkin MD        ortho mix (with morphine) injection   Injection On Call Bruce Larkin MD        HYDROmorphone (DILAUDID) injection 0.25 mg  0.25 mg Intravenous Q5 Min PRN Corie Castaneda, DO        HYDROmorphone (DILAUDID) injection 0.5 mg  0.5 mg Intravenous Q5 Min PRN Corie Castaneda, DO        HYDROcodone-acetaminophen (NORCO) 5-325 MG per tablet 1 tablet  1 tablet Oral Once PRN Corie Castaneda, DO        diphenhydrAMINE (BENADRYL) injection 12.5 mg  12.5 mg Intravenous Once PRN Corie Castaneda, DO        0.9 % sodium chloride bolus  500 mL Intravenous Once PRN Corie Castaneda, DO        [facility]dansetron [facility]) injection 4 mg  4 mg Intravenous Once PRN Corie Edwardb, DO        prochlorperazine (COMPAZINE) injection 5 mg  5 mg Intravenous Once PRN Corie Castaneda, DO        hydrALAZINE (APRESOLINE) POCCL, POCBUN, POCHEMO, POCHCT in the last 72 hours. Coags: No results found for: PROTIME, INR, APTT    HCG (If Applicable): No results found for: PREGTESTUR, PREGSERUM, HCG, HCGQUANT     ABGs: No results found for: PHART, PO2ART, BES4DZH, YQH3XWI, BEART, V1MWIOEL     Type & Screen (If Applicable):  No results found for: LABABO, [address]    Anesthesia Evaluation  Patient summary reviewed and Nursing notes reviewed no history of anesthetic complications:   Airway: Mallampati: I  TM distance: >3 FB   Neck ROM: full  Mouth opening: > = 3 FB Dental: normal exam         Pulmonary:Negative Pulmonary ROS breath sounds clear to auscultation      (-) not a current smoker (never)                           Cardiovascular:  Exercise tolerance: good (>4 METS),       (-) past MI    NYHA Classification: I    Rhythm: regular  Rate: normal           Beta Blocker:  Not on Beta Blocker         Neuro/Psych:   Negative Neuro/Psych ROS              GI/Hepatic/Renal:        (-) GERD       Endo/Other: Negative Endo/Other ROS                    Abdominal:           Vascular: negative vascular ROS. Anesthesia Plan      general     ASA 2     (D/W pt . Adductor canal block if needed for post op pain mgt )  Induction: intravenous. MIPS: Postoperative opioids intended and Prophylactic antiemetics administered. Anesthetic plan and risks discussed with patient and spouse. Plan discussed with CRNA.     Attending anesthesiologist reviewed and agrees with Pre Eval content              Paolo Cash DO   1/15/2020

## 2020-01-15 NOTE — PROGRESS NOTES
Snoring? Do you snore loudly (loud enough to be heard through closed doors, or your bed partner elbows you for snoring at night)? No    Tired? Do you often feel tired, fatigued, or sleepy during the daytime (such as falling asleep during driving)? No    Observed? Has anyone observed you stop breathing or choking/gasping during your sleep? No    Pressure? Do you have or are being treated for high blood pressure? No    Neck Size? (measured around Landons apple)  For male, is your shirt collar 17 inches or larger? For female, is your shirt collar 16 inches or larger? No    Age older than 48years old? Yes    Gender = Male  Yes    Body Mass Index more than 35 kg/m2? No    Risk of CATE Scoring criteria:    [] Low risk:  Yes to 0 - 2 questions    [] Intermediate risk:  Yes to 3 - 4 questions    [] High risk:  Yes to 5 - 8 questions       r ALL PATIENTS THAT SCORE  5 or >:    Results called to OR Scheduling? No    Patient given Sleep Apnea Referral Pamphlet?   No

## 2020-01-15 NOTE — PROGRESS NOTES
Received in room 5512 per bed. Awake and alert, Ace wrap to L leg with immobilizer, patch to R eye. Bed alarm on, will monitor for safety and comfort.

## 2020-01-16 VITALS
DIASTOLIC BLOOD PRESSURE: 71 MMHG | HEART RATE: 66 BPM | TEMPERATURE: 98.5 F | WEIGHT: 180 LBS | OXYGEN SATURATION: 90 % | SYSTOLIC BLOOD PRESSURE: 131 MMHG | RESPIRATION RATE: 16 BRPM | BODY MASS INDEX: 23.86 KG/M2 | HEIGHT: 73 IN

## 2020-01-16 LAB
ANION GAP SERPL CALCULATED.3IONS-SCNC: 14 MMOL/L (ref 3–16)
BUN BLDV-MCNC: 22 MG/DL (ref 7–20)
CALCIUM SERPL-MCNC: 8.6 MG/DL (ref 8.3–10.6)
CHLORIDE BLD-SCNC: 103 MMOL/L (ref 99–110)
CO2: 20 MMOL/L (ref 21–32)
CREAT SERPL-MCNC: 1.1 MG/DL (ref 0.8–1.3)
GFR AFRICAN AMERICAN: >60
GFR NON-AFRICAN AMERICAN: >60
GLUCOSE BLD-MCNC: 191 MG/DL (ref 70–99)
HCT VFR BLD CALC: 40 % (ref 40.5–52.5)
HEMOGLOBIN: 13.4 G/DL (ref 13.5–17.5)
MCH RBC QN AUTO: 30 PG (ref 26–34)
MCHC RBC AUTO-ENTMCNC: 33.5 G/DL (ref 31–36)
MCV RBC AUTO: 89.6 FL (ref 80–100)
PDW BLD-RTO: 12.9 % (ref 12.4–15.4)
PLATELET # BLD: 241 K/UL (ref 135–450)
PMV BLD AUTO: 7.2 FL (ref 5–10.5)
POTASSIUM SERPL-SCNC: 4.5 MMOL/L (ref 3.5–5.1)
RBC # BLD: 4.47 M/UL (ref 4.2–5.9)
SODIUM BLD-SCNC: 137 MMOL/L (ref 136–145)
WBC # BLD: 12.8 K/UL (ref 4–11)

## 2020-01-16 PROCEDURE — 6360000002 HC RX W HCPCS: Performed by: ORTHOPAEDIC SURGERY

## 2020-01-16 PROCEDURE — 2580000003 HC RX 258: Performed by: ORTHOPAEDIC SURGERY

## 2020-01-16 PROCEDURE — 97116 GAIT TRAINING THERAPY: CPT

## 2020-01-16 PROCEDURE — 85027 COMPLETE CBC AUTOMATED: CPT

## 2020-01-16 PROCEDURE — 80048 BASIC METABOLIC PNL TOTAL CA: CPT

## 2020-01-16 PROCEDURE — 6370000000 HC RX 637 (ALT 250 FOR IP): Performed by: ORTHOPAEDIC SURGERY

## 2020-01-16 PROCEDURE — 97161 PT EVAL LOW COMPLEX 20 MIN: CPT

## 2020-01-16 PROCEDURE — 36415 COLL VENOUS BLD VENIPUNCTURE: CPT

## 2020-01-16 PROCEDURE — 97165 OT EVAL LOW COMPLEX 30 MIN: CPT

## 2020-01-16 PROCEDURE — 97530 THERAPEUTIC ACTIVITIES: CPT

## 2020-01-16 RX ADMIN — OXYCODONE 10 MG: 5 TABLET ORAL at 13:49

## 2020-01-16 RX ADMIN — ACETAMINOPHEN 650 MG: 325 TABLET ORAL at 05:20

## 2020-01-16 RX ADMIN — OXYCODONE 10 MG: 5 TABLET ORAL at 03:01

## 2020-01-16 RX ADMIN — SENNOSIDES AND DOCUSATE SODIUM 1 TABLET: 8.6; 5 TABLET ORAL at 09:42

## 2020-01-16 RX ADMIN — THERA TABS 1 TABLET: TAB at 09:42

## 2020-01-16 RX ADMIN — CEFAZOLIN 2 G: 10 INJECTION, POWDER, FOR SOLUTION INTRAVENOUS at 02:21

## 2020-01-16 RX ADMIN — DEXAMETHASONE SODIUM PHOSPHATE 3 MG: 4 INJECTION, SOLUTION INTRAMUSCULAR; INTRAVENOUS at 09:48

## 2020-01-16 RX ADMIN — TAMSULOSIN HYDROCHLORIDE 0.4 MG: 0.4 CAPSULE ORAL at 09:42

## 2020-01-16 RX ADMIN — DOCUSATE SODIUM 100 MG: 100 CAPSULE, LIQUID FILLED ORAL at 09:42

## 2020-01-16 RX ADMIN — Medication 10 ML: at 10:15

## 2020-01-16 RX ADMIN — OXYCODONE 10 MG: 5 TABLET ORAL at 09:42

## 2020-01-16 RX ADMIN — ASPIRIN 81 MG: 81 TABLET, COATED ORAL at 09:43

## 2020-01-16 ASSESSMENT — PAIN DESCRIPTION - DESCRIPTORS
DESCRIPTORS: ACHING;PRESSURE
DESCRIPTORS: ACHING;PRESSURE
DESCRIPTORS: ACHING

## 2020-01-16 ASSESSMENT — PAIN DESCRIPTION - LOCATION
LOCATION: KNEE

## 2020-01-16 ASSESSMENT — PAIN DESCRIPTION - FREQUENCY
FREQUENCY: CONTINUOUS

## 2020-01-16 ASSESSMENT — PAIN DESCRIPTION - PROGRESSION
CLINICAL_PROGRESSION: GRADUALLY WORSENING

## 2020-01-16 ASSESSMENT — PAIN DESCRIPTION - ONSET
ONSET: ON-GOING

## 2020-01-16 ASSESSMENT — PAIN SCALES - GENERAL
PAINLEVEL_OUTOF10: 0
PAINLEVEL_OUTOF10: 4
PAINLEVEL_OUTOF10: 7
PAINLEVEL_OUTOF10: 3
PAINLEVEL_OUTOF10: 3
PAINLEVEL_OUTOF10: 7
PAINLEVEL_OUTOF10: 7

## 2020-01-16 ASSESSMENT — PAIN DESCRIPTION - ORIENTATION
ORIENTATION: LEFT

## 2020-01-16 ASSESSMENT — PAIN - FUNCTIONAL ASSESSMENT
PAIN_FUNCTIONAL_ASSESSMENT: PREVENTS OR INTERFERES SOME ACTIVE ACTIVITIES AND ADLS
PAIN_FUNCTIONAL_ASSESSMENT: PREVENTS OR INTERFERES SOME ACTIVE ACTIVITIES AND ADLS

## 2020-01-16 ASSESSMENT — PAIN DESCRIPTION - PAIN TYPE
TYPE: SURGICAL PAIN

## 2020-01-16 NOTE — CARE COORDINATION
Case Management Assessment            Discharge Note                    Date / Time of Note: 1/16/2020 10:53 AM                  Discharge Note Completed by: Negrita Rivera     Spoke with pt and wife at bedside and pt has his OP PT scheduled and has no DME needs at d/c. Wife will transport to home and prescriptions sent for meds-beds. Patient Name: Donita Parisi   YOB: 1945  Diagnosis: Primary osteoarthritis of left knee [M17.12]  S/P total knee replacement, left [Z96.652]   Date / Time: 1/15/2020  5:56 AM    Current PCP: No primary care provider on file. Clinic patient: No    Hospitalization in the last 30 days: No    Advance Directives:  Code Status: Full Code  [place] DNR form completed and on chart: No    Financial:  Payor: MEDICARE / Plan: MEDICARE PART A AND B / Product Type: *No Product type* /      Pharmacy:    [facility] [address] [facility] [address]  [address] [place] [address]  Phone: [phone] Fax: [phone]      Assistance purchasing medications?:    Assistance provided by Case Management: None at this time    Does patient want to participate in local refill/ meds to beds program?: Yes    Meds To Beds General Rules:  1. Can ONLY be done Monday- Friday between 8:30am-5pm  2. Prescription(s) must be in pharmacy by 3pm to be filled same day  3. Copy of patient's insurance/ prescription drug card and patient face sheet must be sent along with the prescription(s)  4. Cost of Rx cannot be added to hospital bill. If financial assistance is needed, please contact unit  or ;  or  CANNOT provide pharmacy voucher for patients co-pays  5.  Patients can then  the prescription on their way out of the hospital at discharge, or pharmacy can deliver to the bedside if staff is available. (payment due at time of pick-up or delivery - cash, check, or card accepted)     Able to afford home medications/ co-pay costs: Yes    ADLS:  Current PT AM-PAC Score: 20 /24  Current OT AM-PAC Score:   /24      DISCHARGE Disposition: Home- No Services Needed    LOC at discharge: Not Applicable  CHAMP Completed: Not Indicated    Notification completed in HENS/PAS?:  Not Applicable    IMM Completed:   Not Indicated    Transportation:  Transportation PLAN for discharge: family   Mode of Transport: Private Car      The Plan for Transition of Care is related to the following treatment goals of Primary osteoarthritis of left knee [M17.12]  S/P total knee replacement, left [Z96.652]    The Patient and/or patient representative Zane Bence and his family were provided with a choice of provider and agrees with the discharge plan Not Indicated    Freedom of choice list was provided with basic dialogue that supports the patient's individualized plan of care/goals and shares the quality data associated with the providers.  Not Indicated    Care Transitions patient: Lakisha Mock RN  [facility] [employer]  Case Management Department  Ph: [phone]  Fax: [phone]

## 2020-01-16 NOTE — PROGRESS NOTES
Subjective  General  Chart Reviewed: Yes  Additional Pertinent Hx: Pt is a 76 y.o. male adm 1/15 s/p LEFT TOTAL KNEE ARTHROPLASTY, MCL REPAIR. PMH: previous L knee surgereis x 2, arthritis  Family / Caregiver Present: Yes(wife)  Referring Practitioner: Lloyd Torres  Referral Date : 01/15/20  Subjective  Subjective: Pt found in supine. Agreeable to PT. Pain Screening  Patient Currently in Pain: Yes(L knee, not rated, RN aware)  Vital Signs  Patient Currently in Pain: Yes(L knee, not rated, RN aware)       Orientation  Orientation  Overall Orientation Status: Within Functional Limits  Social/Functional History  Social/Functional History  Lives With: Spouse  Type of Home: House  Home Layout: Multi-level, Able to Live on Main level with bedroom/bathroom(man cave in basement)  Home Access: Stairs to enter with rails(2+ 1 MILENA)  Bathroom Shower/Tub: Tub/Shower unit  Bathroom Toilet: Standard(in between sink and tub)  Home Equipment: Rolling walker, 4 wheeled walker(walking sticks)  ADL Assistance: Independent  Homemaking Assistance: Independent  Ambulation Assistance: Independent  Transfer Assistance: Independent  Active : Yes  Leisure & Hobbies: [place] farm - drives tractor, cuts wood, etc.  Very active  Additional Comments: Ela Bradythal about 10 days ago - tripped over [facility]. Pt lives in [place] - 3 hours away.   Staying in hotel tonight and planning on going to OP therapy at Dr. Lloyd Torres' office tomorrow before returning home  Cognition        Objective          AROM RLE (degrees)  RLE AROM: WFL  AROM LLE (degrees)  LLE AROM : (knee NT)  Strength RLE  Strength RLE: WFL  Strength LLE  Strength LLE: (able to SLR independently)        Bed mobility  Supine to Sit: Modified independent  Transfers  Sit to Stand: Stand by assistance  Stand to sit: Stand by assistance  Ambulation  Ambulation?: Yes  Ambulation 1  Device: Rolling Walker  Other Apparatus: Knee Immobilizer(LLE)  Assistance: Stand by assistance(to supervision)  Quality of Gait: step through pattern, decreased stance time LLE, steady with walker, quick galina  Distance: 150'   Stairs/Curb  Stairs?: Yes(Up/down 2 steps x 2 trials with bilat rails first trial and with rail/cane second trial CGA.  )          Treatment included: bed mobility, transfers, gt, pt education      Plan   Plan  Times per week: 7  Times per day: Twice a day  Current Treatment Recommendations: Functional Mobility Training, Endurance Training, Gait Training, Stair training, Patient/Caregiver Education & Training, Safety Education & Training  Safety Devices  Type of devices: Call light within reach, Chair alarm in place, Nurse notified, Left in chair    G-Code       OutComes Score                                                  AM-PAC Score  AM-PAC Inpatient Mobility Raw Score : 20 (01/16/20 1008)  AM-PAC Inpatient T-Scale Score : 47.67 (01/16/20 1008)  Mobility Inpatient CMS 0-100% Score: 35.83 (01/16/20 1008)  Mobility Inpatient CMS G-Code Modifier : Edu Ceron (01/16/20 1008)          Goals  Short term goals  Time Frame for Short term goals: By discharge  Short term goal 1: Sit to stand independent  Short term goal 2: Pt will amb >200' with LRAD independent  Short term goal 3: Pt will up/down 2-3 steps with rail/AD independent       Therapy Time   Individual Concurrent Group Co-treatment   Time In 0903         Time Out 0945         Minutes 42              Timed Code Treatment Minutes: 27      Total Treatment Minutes:  42  If pt d/c'd prior to next treatment, this note serves as a discharge note.       Simin Bejarano, PT

## 2020-01-16 NOTE — PROGRESS NOTES
Orthopaedic Surgery Fellow Post Operative Total Knee Replacement Rounding Progress Note    History of Present Illness:  Fernando Moody is a 76 y.o. male who was seen this morning. There were no events overnight. Pain is controlled. They currently deny any dizziness, fevers, chills, shortness of breath, chest pain, paresthesias, or any other current complaints. The patient did suffer a corneal abrasion in the PACU yesterday and is being treated with an eye patch by the anesthesia team. He notes that his right eye is feeling much better this morning. Medical History:  Patient's medications, allergies, past medical, surgical, social and family histories were reviewed and updated as appropriate. They are as noted.      Social History     Socioeconomic History    Marital status:      Spouse name: Not on file    Number of children: Not on file    Years of education: Not on file    Highest education level: Not on file   Occupational History    Not on file   Social Needs    Financial resource strain: Not on file    Food insecurity:     Worry: Not on file     Inability: Not on file    Transportation needs:     Medical: Not on file     Non-medical: Not on file   Tobacco Use    Smoking status: Never Smoker    Smokeless tobacco: Never Used   Substance and Sexual Activity    Alcohol use: Never     Frequency: Never    Drug use: Never    Sexual activity: Not on file   Lifestyle    Physical activity:     Days per week: Not on file     Minutes per session: Not on file    Stress: Not on file   Relationships    Social connections:     Talks on phone: Not on file     Gets together: Not on file     Attends Baptism service: Not on file     Active member of club or organization: Not on file     Attends meetings of clubs or organizations: Not on file     Relationship status: Not on file    Intimate partner violence:     Fear of current or ex partner: Not on file     Emotionally abused: Not on file Physically abused: Not on file     Forced sexual activity: Not on file   Other Topics Concern    Not on file   Social History Narrative    Not on file       No Known Allergies  No current facility-administered medications on file prior to encounter. Current Outpatient Medications on File Prior to Encounter   Medication Sig Dispense Refill    GLUCOSAMINE-CHONDROITIN PO Take 2 tablets by mouth daily      Collagenase POWD Take by mouth daily      Multiple Vitamins-Minerals (MULTIVITAMIN PO) Take 1 tablet by mouth daily       family history is not on file. He was adopted. Past Medical History:   Diagnosis Date    Arthritis      Past Medical History:   Diagnosis Date    Arthritis      Active Ambulatory Problems     Diagnosis Date Noted    Primary osteoarthritis of left knee 09/28/2016    Effusion of left knee 09/28/2016    Meniscus, lateral, derangement 09/28/2016    Meniscus, medial, derangement 09/28/2016    Hx of tear of ACL (anterior cruciate ligament) 09/28/2016    Bilateral primary osteoarthritis of knee 12/05/2019     Resolved Ambulatory Problems     Diagnosis Date Noted    No Resolved Ambulatory Problems     Past Medical History:   Diagnosis Date    Arthritis        Review of Systems:  Gen: No dizziness, fevers, chills  HEENT: Negative for double vision, visual changes  CV: Negative for chest pain, palpitations  Lungs: Negative for shortness of breath or wheezing  Abdomen: Negative for abdominal pain or bloating   Neurological: Negative for numbness, paresthesias, or weakness  Psychiatric: alert and oriented to person, place and time. Urological: Negative for urinary retention    Vital Signs:  Vitals:    01/16/20 0730   BP: 136/65   Pulse: 67   Resp: 16   Temp: 98.1 °F (36.7 °C)   SpO2: 94%     Height: 6' 1\" (185.4 cm), Weight: 180 lb (81.6 kg), Body mass index is 23.75 kg/m². ,    Physical Exam:     Appearance: Estela Nina is alert, oriented x 3, resting comforably, no acute distress.

## 2020-01-16 NOTE — OP NOTE
medial joint and utilizing a four-pronged staple  with two #2 FiberTapes which were woven in a Krackow locking manner to  pull down the entire MCL as a sleeve and gain stability. The wound was then closed in the usual manner after double irrigation  followed by the extensor mechanism balancing to normal mediolateral  glide of the patella, followed by closure of the dead space with 2-0  Vicryl, followed by closure of the wound with 3-0 and 4-0 Monocryl. The  patient tolerated the procedure well. An Ace cotton compression  dressing was applied with a double ABD on the peroneal nerve. The knee  was placed into the brace to protect the medial ligamentous repair. Estimated blood loss at 986 mL, no complications. This should be a very  excellent functional stable knee for this gentleman.         Shyam Owens MD    D: 01/15/2020 12:17:08       T: 01/15/2020 12:24:58     [email or web address]  Job#: [id number]     Doc#: [id number]    CC:

## 2020-01-16 NOTE — PROGRESS NOTES
Patient discharged with belongings, pain medications, and follow up instructions. IV removed without complications. Patient transported to main entrance via wheelchair, spouse at [facility].

## 2020-01-16 NOTE — PLAN OF CARE
Problem: Falls - Risk of:  Goal: Will remain free from falls  Description  Will remain free from falls  Outcome: Ongoing   Patient is at risk for falls. Patient is in bed with bed alarm on, fall risk ID, Nonskid socks, Bed in lowest position. Call light and bedside table are within reach. Patient calls out approprietly. Will continue to monitor. Problem: Pain:  Goal: Pain level will decrease  Description  Pain level will decrease  Outcome: Ongoing   Patient complains of pain, patient medicated per mar, will continue to monitor.

## 2020-01-16 NOTE — PROGRESS NOTES
Patient is AAOx4. VSS. Patient is up x1 with a walker and tolerating well. Patient is voiding adequately. Patient complains of some pain, patient medicated per mar. Patient has immobilizer on.  Will continue to monitor

## 2020-01-16 NOTE — PROGRESS NOTES
Occupational Therapy   Occupational Therapy Initial Assessment/Tx/Discharge Note  Date: 2020   Patient Name: Katherine Dickinson  MRN: [id number]     : 1945    Date of Service: 2020  Assessment: Pt. is doing well POD #1. Pt demo good safety awareness and will return home with 24hr assist from wife. No acute OT needs identified. Discharge Recommendations:Supa Nation scored a 21/24 on the AM-PAC ADL Inpatient form. Current research shows that an AM-PAC score of 18 or greater is typically associated with a discharge to the patient's home setting. Based on the patients AM-PAC score and their current ADL deficits, it is recommended that the patient have 2-3 sessions per week of Occupational Therapy at d/c to increase the patients independence. See assessment. OT Equipment Recommendations  Equipment Needed: No    Assessment   No Skilled OT: No OT goals identified  REQUIRES OT FOLLOW UP: No  Safety Devices  Safety Devices in place: Yes  Type of devices: Bed alarm in place; Left in bed;Nurse notified;Call light within reach              Restrictions  Position Activity Restriction  Other position/activity restrictions: FWB as tolerated; activity day of surgery. Pt has knee immobilizer on however no orders noted in chart. \"Patient to remain in knee immobilizer at all times\" per ortho note    Subjective   General  Chart Reviewed: Yes  Additional Pertinent Hx: 76 y.o M admitted 1/15 for LTKA. PMHx: arthirtis, L knee surgery, hernia repair, fruacture surgury, appendectomy   Referring Practitioner: Temo  Diagnosis: primary osteoarthritis of L knee  Subjective  Subjective: Pt. in bed upon entry. Pt pleasant and agreeable to therapy. Pain Assessment  Pain Assessment: (no c/o of pain but asked for last round of pain meds.  RN notified. )    Social/Functional History  Social/Functional History  Lives With: Spouse  Type of Home: House  Home Layout: Multi-level, Able to Live on Main level with directed the patient's care, made skilled judgment, and was responsible for assessment and treatment of the patient.          Forrest Odell s/OT   (Keturah Cintron, OTR/L, [id number])

## 2020-01-16 NOTE — PROGRESS NOTES
Patient A&Ox4, VSS. Surgical dressing clean, dry, and intact. Neuro checks within normal limits. Patient ambulating in room with walker, tolerating well. Pain well managed with oral medications. Patient anticipating discharge home this afternoon. Will continue to monitor.

## 2020-01-16 NOTE — PROGRESS NOTES
[facility]  History and Physical      Date:  2020    Name:  Jon Rutledge  Address:  [address] [place] [address] [facility] [address]    :  1945      Age:   76 y.o.    SSN:  [id number]      Medical Record Number:  <[id number]>      Chief Complaint    Pre-op Exam (left knee, TKR  on 1/15/20)      History of Present Illness:  Jon Rutledge is a 76 y.o. male who presents for their pre-op examination. The patient is in good health. The patient has no history of blood clots, no organ dysfunction, not diabetes, no known allergies to medications, tolerates pain medications and is not on any estrogen products. The patient recently had a physical from her PCP and was cleared for surgery and stated being in good health. Patient's PCP note for preoperative clearance was reviewed. Patient lives 3 hours away. Patient plans on conducting physical therapy with combination of our physical therapy clinic as well as 1 located at [facility]. Pain Assessment  Location of Pain: Knee  Location Modifiers: Left  Severity of Pain: 4  Quality of Pain: Sharp, Aching  Duration of Pain: Persistent  Frequency of Pain: Constant  Aggravating Factors: Stairs  Relieving Factors:  Other (Comment)(n/a)  Result of Injury: Yes  Work-Related Injury: No  Are there other pain locations you wish to document?: No    Past Medical History:   Diagnosis Date    Arthritis         Past Surgical History:   Procedure Laterality Date    APPENDECTOMY      FRACTURE SURGERY      arm- has small metal plate in place     HERNIA REPAIR      inguinal     KNEE SURGERY Left     x 2 - 10 years apart    TOTAL KNEE ARTHROPLASTY Left 1/15/2020    LEFT TOTAL KNEE ARTHROPLASTY, MCL REPAIR performed by David Shine MD at [address] [place] [address] History   Adopted: Yes       Social History     Socioeconomic History    Marital status:      Spouse name: None    Number of children: None    Years of education: deficits noted. Normal mood & affect. Eyes: Sclera clear  Ears: Normal external ear  Mouth:  No perioral lesions  Pulm: Respirations unlabored and regular   Skin: Warm, well perfused    Knee Examination: Left    Skin:  There are no skin lesions, cellulitis, or extreme edema in the lower extremities. Sensation is grossly intact to light touch bilaterally lower extremity. The patient has warm and well-perfused Bilateral     Inspection: Small 1 cm well-healing scab to the middle of the tibia. No effusion, ecchymosis, discoloration, erythema, excessive warmth. no gross deformities, no signs of infection. Palpation: There is moderate patellofemoral crepitus, there is no joint line tenderness. No other osseous or soft tissue tenderness around the knee. Negative calf tenderness    Range of Motion:  0-125 degrees without pain or difficulty    Strength:  5/5 quadriceps, 5/5 hamstrings    Special Tests:   No ligament instability, valgus and varus stress test are stable at 0 and 30°. Lachman's exhibits a solid endpoint. Negative posterior drawer. Negative Homans sign    Gait:  Steady, Non antalgic, without the use of assistive devices    Neuro: Sensation equal bilateral lower extremities    Vascular: 2+ posterior tibialis pulse        Office Procedures:  Orders Placed This Encounter   Procedures    Compression Stocking $30     Patient was supplied a Compression Sleeve. This retail item was supplied to provide functional support and assist in controlling swelling in the lower extremities. Verbal and written instructions for the use of and application of this item were provided. The patient was educated and fit by a healthcare professional with expert knowledge and specialization in brace application. They were instructed to contact the office immediately should the equipment result in increased pain, decreased sensation, increased swelling or worsening of the condition.             Assessment: Patient is a 76 y.o. male presenting to the office for his preoperative visit    Encounter Diagnoses   Name Primary?  Primary osteoarthritis of left knee Yes    Pain in both knees, unspecified chronicity          Medical decision making/treatment plan:  Patient's workup and evaluation were reviewed with the patient in the office today. Perioperative course was thoroughly discussed with the patient in the office today. Patient was educated that this procedure has approximately 80% success rate. He was educated that 20% of patients will have limitations and instability with walking and activity. This patient may require ligamentous adjustments with his total knee replacement. Given the patient's history, physical exam as well as the complexity of his total knee replacement the patient may require to be admitted for optimization prior to discharge. The patient is scheduled for TKR and a discussion and comprehensive presentation of TKR RBA including providing extensive information and written material on the surgery, PO course and rehab and patient expectations and compliance. The Web site patient instructional series was reviewed for further info for the patient and family. The PO instruction sheet is provided and details on the DVT program and skin preparation pre op explained. The risks explained included but not limited to infection, pain, swelling, woumd healing, blood clots, bleeding, fibrosis, anesthesia, allergies meds, atrophy, and limitation in knee motion, implant loosening, need for additional surgery, alignment problems. Success rates reviewed including a normal knee cannot be established and there are limitations on activity that a TKR requires including 10% of patients in general having knee pain limits, weakness on chair on stair activity, and in particular that recreational activity may require major limits. The final result cannot be predicted and each patient responds to surgery differently.    The patient verbally expressed an understanding and all questions answered. The patient has major arthritic damage to the knee joint with pain limiting daily activities and significant restrictions and limitations effecting the quality of life. All conservative measures have been completed and the patient wishes to undergo TKR. All the patient's questions were answered while in the clinic. The patient is understanding of all instructions and agrees with the plan. 01/16/20  9:59 AM                Houston Yoon PA-C    Physician Assistant - Certified  [address]    01/16/20 9:59 AM    During this examination, I, [facility] [place], [place], functioned as a scribe for Dr. Kanchan Frost. This dictation was performed with a verbal recognition program (DRAGON) and it was checked for errors. It is possible that there are still dictated errors within this office note. If so, please bring any errors to my attention for an addendum. All efforts were made to ensure that this office note is accurate. This dictation was performed with a verbal recognition program (DRAGON) and it was checked for errors. It is possible that there are still dictated errors within this office note. If so, please bring any errors to my attention for an addendum. All efforts were made to ensure that this office note is accurate. Supervising Physician Attestation:  I, Dr. Kanchan Frost, personally performed the services described in this documentation as scribed above, and it is both accurate and complete and I agree with all pertinent clinical information. I personally interviewed the patient and performed a physical examination and medical decision making. I discussed the patient's condition and treatment options and have  reviewed and agree with the past medical, family and social history unless otherwise noted. All of the patient's questions were answered.       Board Certified Orthopaedic Surgeon  [facility] and [address]  President and [address] [employer]  Professor of [employer]

## 2020-01-17 ENCOUNTER — HOSPITAL ENCOUNTER (OUTPATIENT)
Dept: PHYSICAL THERAPY | Age: 75
Setting detail: THERAPIES SERIES
Discharge: HOME | End: 2020-01-17
Payer: MEDICARE

## 2020-01-17 PROCEDURE — 97530 THERAPEUTIC ACTIVITIES: CPT

## 2020-01-17 PROCEDURE — 97140 MANUAL THERAPY 1/> REGIONS: CPT

## 2020-01-17 PROCEDURE — 97016 VASOPNEUMATIC DEVICE THERAPY: CPT

## 2020-01-17 PROCEDURE — 97110 THERAPEUTIC EXERCISES: CPT

## 2020-01-17 NOTE — PLAN OF CARE
Information:  · Diagnosis: M17.12 (ICD-10-CM) - Primary osteoarthritis of left knee  · Treatment Diagnosis: M25.562 L Knee Pain; M25.662 L Knee Stiffness; R53.1 Weakness  · S/P  Left TKR + MCL Repair  · DOS: 1-15-20  · Medications: Percocet 1 tab every 6 hours; ASA BID  Insurance/Certification information:  PT Insurance Information: PT BENEFITS 2020 FACILITY/ MEDICARE PRIMARY/ EFFECTIVE 1-1-16/ ACTIVE/ PAYS 80%/ NO VISIT LIMIT MED NEC/ 0 AUTH/ [employer] SECONDARY INS/ 1-13-20 PAG  Physician Information:  Referring Practitioner: Clair Sears  Has the plan of care been signed (Y/N):        [x]  Yes  []  No     Date of Patient follow up with Physician: 1,3,6,12 weeks PO  Patient seen in consultation with Dr. Clair Sears who established the initial/subsequent treatment protocol. Is this a Progress Report:     []  Yes  [x]  No     If Yes:  Date Range for reporting period:  Beginning  Ending    Progress report will be due (10 Rx or 30 days whichever is less): 7/93/25       Recertification will be due (POC Duration  / 90 days whichever is less): 4/17/20       Visit # Insurance Allowable Auth Required   2 MEDICARE []  Yes [x]  No      OUTCOME MEASURE DATE DEFICIT   LEFS 1/14/20 pre-op 41% Deficit   LEFS 1/17/20 83% Deficit   Patient given satisfaction survey? [] Yes   [x] No       Latex Allergy:  [x]NO      []YES  Preferred Language for Healthcare:   [x]English       []other:    Pain level:  5-8/10     SUBJECTIVE:  Patient presents to PT 2 days PO from Left TKR + MCL Repair . Not doing well today - has a lot of pain in knee and is frustrated by having had a ligament repair done and needing a brace. Denies any fever, nausea, numbness/tingling, chest pain, shortness of breath, or calf pain.        OBJECTIVE:       1/17/20  ROM PROM AROM Overpressure Comment     L R L R L R     Flexion 40    130     Manual   Extension -5    +5                                                1/17/20  Strength L R Comment   Quad 3 5 QS   Hamstring 5 5   care and ADLs  [] (13429) Gait Re-education- Provided training and instruction to the patient for proper LE, core and proximal hip recruitment and positioning and eccentric body weight control with ambulation re-education including up and down stairs     Home Exercise Program:    [x] (43450) Reviewed/Progressed HEP activities related to strengthening, flexibility, endurance, ROM of core, proximal hip and LE for functional self-care, mobility, lifting and ambulation/stair navigation   [] (47687)Reviewed/Progressed HEP activities related to improving balance, coordination, kinesthetic sense, posture, motor skill, proprioception of core, proximal hip and LE for self care, mobility, lifting, and ambulation/stair navigation      Manual Treatments:  PROM / STM / Oscillations-Mobs:  G-I, II, III, IV (PA's, Inf., Post.)  [x] (96663) Provided manual therapy to mobilize LE, proximal hip and/or LS spine soft tissue/joints for the purpose of modulating pain, promoting relaxation,  increasing ROM, reducing/eliminating soft tissue swelling/inflammation/restriction, improving soft tissue extensibility and allowing for proper ROM for normal function with self care, mobility, lifting and ambulation. Modalities:  Vaso x 15'    Charges:  Timed Code Treatment Minutes: 40'   Total Treatment Minutes: 54'       [] EVAL (LOW) 64721 (typically 20 minutes face-to-face)  [] EVAL (MOD) 65982 (typically 30 minutes face-to-face)  [] EVAL (HIGH) 30753 (typically 45 minutes face-to-face)  [] RE-EVAL   [x] VB(33953) x 1    [] IONTO  [] NMR (37470) x     [x] VASO  [x] Manual (04582) x 1   [] Other:  [x] TA x 1     [] Mech Traction (57082)  [] ES(attended) (41395)      [] ES (un) (47448):     GOALS:   Patient stated goal: Reduce pain in knee with ADL's    [] Progressing: [] Met: [] Not Met: [] Adjusted    Therapist goals for Patient:   Short Term Goals: To be achieved in: 2 weeks  1.  Independent in HEP and progression per patient tolerance, in order to prevent re-injury. [] Progressing: [] Met: [] Not Met: [] Adjusted   2. Patient will have a decrease in pain to facilitate improvement in movement, function, and ADLs as indicated by Functional Deficits. [] Progressing: [] Met: [] Not Met: [] Adjusted    Long Term Goals: To be achieved in: 12 weeks  1. Disability index score of 30% or less for the LEFS to assist with reaching prior level of function. [] Progressing: [] Met: [] Not Met: [] Adjusted  2. Patient will demonstrate increased AROM to 0-120 or greater to allow for proper joint functioning as indicated by patients Functional Deficits. [] Progressing: [] Met: [] Not Met: [] Adjusted  3. Patient will demonstrate an increase in Strength to 5/5 in BLE to allow for proper functional mobility as indicated by patients Functional Deficits. [] Progressing: [] Met: [] Not Met: [] Adjusted  4. Patient will return to all ADL and other functional activities without increased symptoms or restriction. [] Progressing: [] Met: [] Not Met: [] Adjusted  5. Patient will ascend/descend one flight of stairs without increased pain in left knee. (patient specific functional goal)    [] Progressing: [] Met: [] Not Met: [] Adjusted       Overall Progression Towards Functional goals/ Treatment Progress Update:  [] Patient is progressing as expected towards functional goals listed. [] Progression is slowed due to complexities/Impairments listed. [] Progression has been slowed due to co-morbidities.   [x] Plan just implemented, too soon to assess goals progression <30days   [] Goals require adjustment due to lack of progress  [] Patient is not progressing as expected and requires additional follow up with physician  [] Other    Prognosis for POC: [x] Good [] Fair  [] Poor    Patient requires continued skilled intervention: [x] Yes  [] No    Treatment/Activity Tolerance:  [] Patient able to complete treatment  [] Patient limited by fatigue  [x] Patient limited

## 2020-01-23 ENCOUNTER — HOSPITAL ENCOUNTER (OUTPATIENT)
Dept: PHYSICAL THERAPY | Age: 75
Setting detail: THERAPIES SERIES
Discharge: HOME | End: 2020-01-23
Payer: MEDICARE

## 2020-01-23 PROCEDURE — 97140 MANUAL THERAPY 1/> REGIONS: CPT

## 2020-01-23 PROCEDURE — 97110 THERAPEUTIC EXERCISES: CPT

## 2020-01-23 PROCEDURE — 97530 THERAPEUTIC ACTIVITIES: CPT

## 2020-01-23 PROCEDURE — MISCCOLD COLD THERAPY UNIT AND PAD: Performed by: ORTHOPAEDIC SURGERY

## 2020-01-23 PROCEDURE — 97016 VASOPNEUMATIC DEVICE THERAPY: CPT

## 2020-01-23 RX ORDER — OXYCODONE HYDROCHLORIDE AND ACETAMINOPHEN 5; 325 MG/1; MG/1
1 TABLET ORAL EVERY 6 HOURS PRN
Qty: 28 TABLET | Refills: 0 | Status: SHIPPED | OUTPATIENT
Start: 2020-01-23 | End: 2020-01-30

## 2020-01-23 RX ORDER — METHYLPREDNISOLONE 4 MG/1
TABLET ORAL
Qty: 1 KIT | Refills: 0 | Status: SHIPPED | OUTPATIENT
Start: 2020-01-23 | End: 2020-01-29

## 2020-01-23 NOTE — FLOWSHEET NOTE
The [address] [place] [address] and [facility]      Physical Therapy Daily Treatment Note  Date:  2020    Patient Name:  Renzo Edwards    :  [birth year]  MRN: [id number]  Restrictions/Precautions:    Medical/Treatment Diagnosis Information:  · Diagnosis: M17.12 (ICD-10-CM) - Primary osteoarthritis of left knee  · Treatment Diagnosis: M25.562 L Knee Pain; M25.662 L Knee Stiffness; R53.1 Weakness  · S/P  Left TKR + MCL Repair  · DOS: 1-15-20  · Medications: Percocet 1 tab every 6 hours; ASA BID; Advil 3-4x/day; Medrol dose pack (start 83)  Insurance/Certification information:  PT Insurance Information: PT BENEFITS  FACILITY/ MEDICARE PRIMARY/ EFFECTIVE 16/ ACTIVE/ PAYS 80%/ NO VISIT LIMIT MED NEC/ 0 AUTH/ [employer] SECONDARY INS/ 20 PAG  Physician Information:  Referring Practitioner: Fredrick Titus  Has the plan of care been signed (Y/N):        [x]  Yes  []  No     Date of Patient follow up with Physician: 3,6,12 weeks PO  Patient seen in consultation with Dr. Fredrick Titus who established the initial/subsequent treatment protocol. 20: ok straight line ROM, given medrol dose pack, ROM goal is  at 3 weeks post op    Is this a Progress Report:     []  Yes  [x]  No     If Yes:  Date Range for reporting period:  Beginning  Ending    Progress report will be due (10 Rx or 30 days whichever is less):        Recertification will be due (POC Duration  / 90 days whichever is less): 20       Visit # Insurance Allowable Auth Required   3 MEDICARE []  Yes [x]  No      OUTCOME MEASURE DATE DEFICIT   LEFS 20 pre-op 41% Deficit   LEFS 20 83% Deficit   Patient given satisfaction survey? [] Yes   [x] No       Latex Allergy:  [x]NO      []YES  Preferred Language for Healthcare:   [x]English       []other:    Pain level:  5-8/10     SUBJECTIVE:  Patient presents to PT 1 week PO from Left TKR + MCL Repair.  Notes he had PT earlier this week at home but [facility] of an 1230pm-240pm    [] EVAL (LOW) 85094 (typically 20 minutes face-to-face)  [] EVAL (MOD) 24729 (typically 30 minutes face-to-face)  [] EVAL (HIGH) 77224 (typically 45 minutes face-to-face)  [] RE-EVAL   [x] VR(23727) x 2    [] IONTO  [] NMR (24040) x     [x] VASO  [x] Manual (04903) x 1   [] Other:  [x] TA x 1     [] Mech Traction (78023)  [] ES(attended) (78045)      [] ES (un) (03027):     GOALS:   Patient stated goal: Reduce pain in knee with ADL's    [] Progressing: [] Met: [] Not Met: [] Adjusted    Therapist goals for Patient:   Short Term Goals: To be achieved in: 2 weeks  1. Independent in HEP and progression per patient tolerance, in order to prevent re-injury. [] Progressing: [] Met: [] Not Met: [] Adjusted   2. Patient will have a decrease in pain to facilitate improvement in movement, function, and ADLs as indicated by Functional Deficits. [] Progressing: [] Met: [] Not Met: [] Adjusted    Long Term Goals: To be achieved in: 12 weeks  1. Disability index score of 30% or less for the LEFS to assist with reaching prior level of function. [] Progressing: [] Met: [] Not Met: [] Adjusted  2. Patient will demonstrate increased AROM to 0-120 or greater to allow for proper joint functioning as indicated by patients Functional Deficits. [] Progressing: [] Met: [] Not Met: [] Adjusted  3. Patient will demonstrate an increase in Strength to 5/5 in BLE to allow for proper functional mobility as indicated by patients Functional Deficits. [] Progressing: [] Met: [] Not Met: [] Adjusted  4. Patient will return to all ADL and other functional activities without increased symptoms or restriction. [] Progressing: [] Met: [] Not Met: [] Adjusted  5.  Patient will ascend/descend one flight of stairs without increased pain in left knee. (patient specific functional goal)    [] Progressing: [] Met: [] Not Met: [] Adjusted       Overall Progression Towards Functional goals/ Treatment Progress Update:  [] Patient is progressing as expected towards functional goals listed. [] Progression is slowed due to complexities/Impairments listed. [] Progression has been slowed due to co-morbidities. [x] Plan just implemented, too soon to assess goals progression <30days   [] Goals require adjustment due to lack of progress  [] Patient is not progressing as expected and requires additional follow up with physician  [] Other    Prognosis for POC: [x] Good [] Fair  [] Poor    Patient requires continued skilled intervention: [x] Yes  [] No    Treatment/Activity Tolerance:  [] Patient able to complete treatment  [] Patient limited by fatigue  [x] Patient limited by pain     [] Patient limited by other medical complications  [x] Other: Patient limited most with KF ROM with pain and tightness limiting his motion. Only able to achieve 42 degrees on ERMI despite prolonged time stretching at EOB and with Biodex. Patient saw MD who wishes  degrees in 2 weeks otherwise will need HANS. Long discussion with patient regarding symptom expectation when performing KF ROM and KF OP program with 60' daily of TERT. He verbalized understanding. All questions answered. Patient still requires further therapy to address decreased ROM, decreased strength, decreased balance, increased pain, increased swelling and impaired gait mechanics that is causing a decrease in their overall functional mobility in order to safely return patient to their PLOF. PLAN: See eval  [x] Continue per plan of care [] Alter current plan (see comments above)  [] Plan of care initiated [] Hold pending MD visit [] Discharge    Electronically signed by:  J Luis Dillard, PT, DPT    Note: If patient does not return for scheduled/ recommended follow up visits, this note will serve as a discharge from care along with most recent update on progress.

## 2020-01-30 ENCOUNTER — HOSPITAL ENCOUNTER (OUTPATIENT)
Dept: PHYSICAL THERAPY | Age: 75
Setting detail: THERAPIES SERIES
Discharge: HOME | End: 2020-01-30
Payer: MEDICARE

## 2020-01-30 PROCEDURE — 97110 THERAPEUTIC EXERCISES: CPT

## 2020-01-30 PROCEDURE — 97016 VASOPNEUMATIC DEVICE THERAPY: CPT

## 2020-01-30 PROCEDURE — 97140 MANUAL THERAPY 1/> REGIONS: CPT

## 2020-01-30 PROCEDURE — 97530 THERAPEUTIC ACTIVITIES: CPT

## 2020-01-30 NOTE — FLOWSHEET NOTE
change in symptoms or ability to bend. Still has most difficulty with his KF ROM although thinks it is doing better than last week. OBJECTIVE:       1/30/20  ROM PROM AROM Overpressure Comment     L R L R L R     Flexion   72   50 130     Cold  ERMI   Extension +2    +5                                                1/23/20  Strength L R Comment   Quad 3+ 5 QS   Hamstring 5 5     Gastroc NT NT     Hip  flexion 4+ 4+     Hip abd NT NT                            1/23/20  Special Test Results/Comment   Homans Neg   Temperature 97.5      1/30/20  Girth L R   Mid Patella 39.0 36.0   Suprapatellar 38.8 35.2   5cm above 38.0 35.2   15cm above          Reflexes/Sensation:               [x]? Dermatomes/Myotomes intact               [x]? Reflexes equal and normal bilaterally              []?Other:     Joint mobility:                [x]? Normal                       []?Hypo              []?Hyper     Palpation: General PO tenderness 1/17/20     Functional Mobility/Transfers: Ind     Posture: Varus     Bandages/Dressings/Incisions: Aquacel removed today; incision closed, healing well; no drainage or signs/symptoms of infection; removed one suture from just below incision; steri strips re-applied 1/23/20    Gait: (include devices/WB status) FWW, 50% PWB with KI in place 1/17/20     Orthopedic Special Tests:      TEST INITIAL  1-14-20  Pre-op   GOAL   SINGLE LEG STANCE TIME L: 10\"     R: 10\"   >25 SECONDS   30 Second sit>stand 22 Reps   >12 reps          RESTRICTIONS/PRECAUTIONS: TKR + MCL Protocol: protect MCL x 3 weeks = KI in place 50-75% PWB,     Exercises/Interventions:  Exercise/Equipment Resistance/Repetitions Other comments   Stretching       Hamstring 5 x 30\"    Hip Flexor     ITB     Figure 4     Quad     Inclined Calf     Towel Pull 5 x 30\"            ROM       EOB Self-Assist     Sheet Pull     Wall Slide     Biodex Passive 15'    Recumbent Bike     ERMI 5 x 1'    Hang Weights 10' Prop   Ankle Pumps x50 hourly Patellar Glides       Medial 2'     Superior 2'     Inferior 2'             Isometrics       Quad sets 10 x 10\"                 SLR       Supine 3 x 10    Prone     Abduction     Adducton     SLR+               Glutes       Bridges     Supine Clams     S/L Clams     Side Stepping      Monster walks              CKC       Weight Shift 10 x 10\" 50-75% KI in place   Calf raises 3 x 10 Biodex %WBing   Wall sits     Step ups       Squatting     CC TKE     SL DL               PRE       Extension     Flexion     Leg Press       Cable Column               Balance       Tandem Stance     Tilt board       SLS      Biodex balance               Other       Treadmill       Gait Training          Manual Interventions     STM 5' QT, PT, Incision      Patient have access to gym? [] Yes  [x] No  Patient have equipment at home? [] Yes  [x] No       Patient Education:  1/14/20: Educated patient on all pre-op and post-op instructions including but not limited to R.I.C.E., post-op HEP, DVT prevention, warning signs of infection and DVT, activity limitations. 1/17/20: extensive education given to patient and wife regarding PO care and restrictions of surgery; provided with OOT packet  1/23/20: updated and reviewed HEP; emphasized KF OP program with goal of 60 minutes/day at end range (see new HEP sheet in media)    HEP:  Patient instructed on HEP on this date with handout provided and all questions answered. Patient was instructed to contact PT with any complications or questions about HEP moving forward. Patient verbally stated he understood. Updated 1/23/20     Therapeutic Exercise and NMR EXR  [x] (42220) Provided verbal/tactile cueing for activities related to strengthening, flexibility, endurance, ROM for improvements in LE, proximal hip, and core control with self care, mobility, lifting, ambulation.   [x] (40279) Provided verbal/tactile cueing for activities related to improving balance, coordination, kinesthetic sense, posture, motor skill, proprioception  to assist with LE, proximal hip, and core control in self care, mobility, lifting, ambulation and eccentric single leg control. NMR and Therapeutic Activities:    [x] (59320 or 88751) Provided verbal/tactile cueing for activities related to improving balance, coordination, kinesthetic sense, posture, motor skill, proprioception and motor activation to allow for proper function of core, proximal hip and LE with self care and ADLs  [] (15258) Gait Re-education- Provided training and instruction to the patient for proper LE, core and proximal hip recruitment and positioning and eccentric body weight control with ambulation re-education including up and down stairs     Home Exercise Program:    [x] (12593) Reviewed/Progressed HEP activities related to strengthening, flexibility, endurance, ROM of core, proximal hip and LE for functional self-care, mobility, lifting and ambulation/stair navigation   [] (87074)Reviewed/Progressed HEP activities related to improving balance, coordination, kinesthetic sense, posture, motor skill, proprioception of core, proximal hip and LE for self care, mobility, lifting, and ambulation/stair navigation      Manual Treatments:  PROM / STM / Oscillations-Mobs:  G-I, II, III, IV (PA's, Inf., Post.)  [x] (55440) Provided manual therapy to mobilize LE, proximal hip and/or LS spine soft tissue/joints for the purpose of modulating pain, promoting relaxation,  increasing ROM, reducing/eliminating soft tissue swelling/inflammation/restriction, improving soft tissue extensibility and allowing for proper ROM for normal function with self care, mobility, lifting and ambulation.      Modalities:  Vaso x 15'    Charges:  Timed Code Treatment Minutes: 39'   Total Treatment Minutes: 60'   1225pm-205pm    [] EVAL (LOW) 50013 (typically 20 minutes face-to-face)  [] EVAL (MOD) 82726 (typically 30 minutes face-to-face)  [] EVAL (HIGH) 41075 (typically 45 minutes face-to-face)  [] RE-EVAL   [x] RA(63399) x 1   [] IONTO  [] NMR (34277) x     [x] VASO  [x] Manual (22538) x 1   [] Other:  [x] TA x 1     [] Mech Traction (21089)  [] ES(attended) (81287)      [] ES (un) (94337):     GOALS:   Patient stated goal: Reduce pain in knee with ADL's    [] Progressing: [] Met: [] Not Met: [] Adjusted    Therapist goals for Patient:   Short Term Goals: To be achieved in: 2 weeks  1. Independent in HEP and progression per patient tolerance, in order to prevent re-injury. [] Progressing: [] Met: [] Not Met: [] Adjusted   2. Patient will have a decrease in pain to facilitate improvement in movement, function, and ADLs as indicated by Functional Deficits. [] Progressing: [] Met: [] Not Met: [] Adjusted    Long Term Goals: To be achieved in: 12 weeks  1. Disability index score of 30% or less for the LEFS to assist with reaching prior level of function. [] Progressing: [] Met: [] Not Met: [] Adjusted  2. Patient will demonstrate increased AROM to 0-120 or greater to allow for proper joint functioning as indicated by patients Functional Deficits. [] Progressing: [] Met: [] Not Met: [] Adjusted  3. Patient will demonstrate an increase in Strength to 5/5 in BLE to allow for proper functional mobility as indicated by patients Functional Deficits. [] Progressing: [] Met: [] Not Met: [] Adjusted  4. Patient will return to all ADL and other functional activities without increased symptoms or restriction. [] Progressing: [] Met: [] Not Met: [] Adjusted  5. Patient will ascend/descend one flight of stairs without increased pain in left knee. (patient specific functional goal)    [] Progressing: [] Met: [] Not Met: [] Adjusted       Overall Progression Towards Functional goals/ Treatment Progress Update:  [] Patient is progressing as expected towards functional goals listed. [] Progression is slowed due to complexities/Impairments listed.   [] Progression has been slowed due to co-morbidities. [x] Plan just implemented, too soon to assess goals progression <30days   [] Goals require adjustment due to lack of progress  [] Patient is not progressing as expected and requires additional follow up with physician  [] Other    Prognosis for POC: [x] Good [] Fair  [] Poor    Patient requires continued skilled intervention: [x] Yes  [] No    Treatment/Activity Tolerance:  [] Patient able to complete treatment  [] Patient limited by fatigue  [x] Patient limited by pain     [] Patient limited by other medical complications  [x] Other: Patient limited most with KF ROM with pain and tightness limiting his motion. Improved today to 72 degrees but is still significantly behind MD goal for 2 weeks PO. Patient to return to [place] Tuesday to see MD for x-ray and discuss possibility of HANS. PT spent extensive time with patient and his wife discussing HANS protocol and procedure. All questions answered. Patient still requires further therapy to address decreased ROM, decreased strength, decreased balance, increased pain, increased swelling and impaired gait mechanics that is causing a decrease in their overall functional mobility in order to safely return patient to their PLOF. PLAN: See eval  [x] Continue per plan of care [] Alter current plan (see comments above)  [] Plan of care initiated [] Hold pending MD visit [] Discharge    Electronically signed by:  Tiffanie Alcala, PT, DPT    Note: If patient does not return for scheduled/ recommended follow up visits, this note will serve as a discharge from care along with most recent update on progress.

## 2020-02-04 ENCOUNTER — HOSPITAL ENCOUNTER (OUTPATIENT)
Dept: PHYSICAL THERAPY | Age: 75
Setting detail: THERAPIES SERIES
Discharge: HOME | End: 2020-02-04
Payer: MEDICARE

## 2020-02-04 ENCOUNTER — OFFICE VISIT (OUTPATIENT)
Dept: ORTHOPEDIC SURGERY | Age: 75
End: 2020-02-04

## 2020-02-04 VITALS
HEART RATE: 65 BPM | WEIGHT: 179.9 LBS | HEIGHT: 73 IN | DIASTOLIC BLOOD PRESSURE: 63 MMHG | SYSTOLIC BLOOD PRESSURE: 127 MMHG | BODY MASS INDEX: 23.84 KG/M2

## 2020-02-04 PROCEDURE — 99024 POSTOP FOLLOW-UP VISIT: CPT | Performed by: ORTHOPAEDIC SURGERY

## 2020-02-04 PROCEDURE — 97140 MANUAL THERAPY 1/> REGIONS: CPT

## 2020-02-04 PROCEDURE — 97110 THERAPEUTIC EXERCISES: CPT

## 2020-02-04 PROCEDURE — 97530 THERAPEUTIC ACTIVITIES: CPT

## 2020-02-04 PROCEDURE — 97016 VASOPNEUMATIC DEVICE THERAPY: CPT

## 2020-02-04 NOTE — PROGRESS NOTES
Chief Complaint    Follow-up (left knee )      History of Present Illness:  Jimbo Morales is a 76 y.o. male who presents for follow up of left knee. The patient is currently 3 weeks status post total knee arthroplasty with MCL tightening on 1/15/20. Over all the patient is doing well post operatively. His has no pain complaints but is having some difficulty achieving knee flexion range of motion. He has been compliant with his physical therapy plan thus far completing physical therapy with our clinic as well as 1 located at [facility]. He has also been completing overpressure at home 3 times a day.       Past Medical History:   Diagnosis Date    Arthritis         Past Surgical History:   Procedure Laterality Date    APPENDECTOMY      FRACTURE SURGERY      arm- has small metal plate in place     HERNIA REPAIR      inguinal     KNEE SURGERY Left     x 2 - 10 years apart    TOTAL KNEE ARTHROPLASTY Left 1/15/2020    LEFT TOTAL KNEE ARTHROPLASTY, MCL REPAIR performed by Kanchan Frost MD at [address] [place] [address] History   Adopted: Yes       Social History     Socioeconomic History    Marital status:      Spouse name: None    Number of children: None    Years of education: None    Highest education level: None   Occupational History    None   Social Needs    Financial resource strain: None    Food insecurity:     Worry: None     Inability: None    Transportation needs:     Medical: None     Non-medical: None   Tobacco Use    Smoking status: Never Smoker    Smokeless tobacco: Never Used   Substance and Sexual Activity    Alcohol use: Never     Frequency: Never    Drug use: Never    Sexual activity: None   Lifestyle    Physical activity:     Days per week: None     Minutes per session: None    Stress: None   Relationships    Social connections:     Talks on phone: None     Gets together: None     Attends Tenriism service: None     Active member of club or organization: None     Attends meetings of clubs or organizations: None     Relationship status: None    Intimate partner violence:     Fear of current or ex partner: None     Emotionally abused: None     Physically abused: None     Forced sexual activity: None   Other Topics Concern    None   Social History Narrative    None       Current Outpatient Medications   Medication Sig Dispense Refill    aspirin EC 81 MG EC tablet Take 1 tablet by mouth 2 times daily for 28 days 56 tablet 0    polyethylene glycol (GLYCOLAX) powder Take 17 g by mouth daily 510 g 0    GLUCOSAMINE-CHONDROITIN PO Take 2 tablets by mouth daily      Collagenase POWD Take by mouth daily      Multiple Vitamins-Minerals (MULTIVITAMIN PO) Take 1 tablet by mouth daily       No current facility-administered medications for this visit. No Known Allergies    Review of Systems:  A 14 point review of systems was completed by the patient and is available in the media section of the scanned medical record and was reviewed on 2/4/2020. The review is negative with the exception of those things mentioned in the HPI and Past Medical History     Vital Signs:   /63   Pulse 65   Ht 6' 0.99\" (1.854 m)   Wt 179 lb 14.3 oz (81.6 kg)   BMI 23.74 kg/m²     General Exam:   Mental Status: The patient is oriented to time, place and person. The patient's mood and affect are appropriate. Neurological: The patient has good coordination. There is no weakness or sensory deficit. Knee Examination: Left    Skin:  There are no skin lesions, cellulitis, or extreme edema in the lower extremities. Sensation is grossly intact to light touch bilaterally lower extremity. The patient has warm and well-perfused Bilateral lower extremities with brisk capillary refill. Inspection:  The is mild edema, no gross deformities, and no signs of infection. Palpation: There is no patellofemoral crepitus, there is significant tenderness over the knee medial aspect of the knee.      Range of Motion:  2 to 75  and grossly intact with pain and/or difficulty    Strength: Motor is grossly intact    Special Tests: There is no ligament instability. Kj Lat/Med is stable     Gait: antalgic  with the use of assistive devices    Alignment: neutral    Radiology:     Shanda Arthur x-rays (3 views: AP, lateral and patella views) of his left KNEE taken 2/4/20 showed a satisfactory TKR with no evidence of acute fracture or loosening. Office Procedures:  Orders Placed This Encounter   Procedures    XR KNEE LEFT (3 VIEWS)     Standing Status:   Future     Number of Occurrences:   1     Standing Expiration Date:   2/4/2021            Assessment: Shanda Arthur is a 76 y.o. male who presents for follow up of right knee(s). Encounter Diagnosis   Name Primary?  Left knee pain, unspecified chronicity Yes       Patient's workup and evaluation were reviewed with the patient in the office today. Imaging was also reviewed with the patient in the clinic today. Given the patient's history and physical exam he continues to do well post operatively on the post operative plan. Given his range of motion deficits, the patient will need to undergo a manipulation under anesthesia. If the patient does not undergo this manipulation to break up the scar tissue, it is likely that the patient will not fully regain his full range of motion post operatively and may have permanent loss of motion. Treatment Plan:    1. Activity modification and rest  2. Ice 20 minutes every 1-2 hours PRN  3. NSAIDs PRN  4. Elevation at least 2 inches above the heart with pillows supporting the joint underneath for swelling  5. Home exercises to maintain strength and range of motion  6. Physical therapy including Manual Therapy, Strengthening, Stretching, Joint Mobilization, Gait Training, Neuromuscular Re-Education and Modalities  7. Manipulation under anesthesia    Diagnoses and treatments were reviewed with the patient today.

## 2020-02-04 NOTE — FLOWSHEET NOTE
coordination, kinesthetic sense, posture, motor skill, proprioception  to assist with LE, proximal hip, and core control in self care, mobility, lifting, ambulation and eccentric single leg control. NMR and Therapeutic Activities:    [x] (67852 or 77293) Provided verbal/tactile cueing for activities related to improving balance, coordination, kinesthetic sense, posture, motor skill, proprioception and motor activation to allow for proper function of core, proximal hip and LE with self care and ADLs  [] (32461) Gait Re-education- Provided training and instruction to the patient for proper LE, core and proximal hip recruitment and positioning and eccentric body weight control with ambulation re-education including up and down stairs     Home Exercise Program:    [x] (45682) Reviewed/Progressed HEP activities related to strengthening, flexibility, endurance, ROM of core, proximal hip and LE for functional self-care, mobility, lifting and ambulation/stair navigation   [] (98711)Reviewed/Progressed HEP activities related to improving balance, coordination, kinesthetic sense, posture, motor skill, proprioception of core, proximal hip and LE for self care, mobility, lifting, and ambulation/stair navigation      Manual Treatments:  PROM / STM / Oscillations-Mobs:  G-I, II, III, IV (PA's, Inf., Post.)  [x] (22377) Provided manual therapy to mobilize LE, proximal hip and/or LS spine soft tissue/joints for the purpose of modulating pain, promoting relaxation,  increasing ROM, reducing/eliminating soft tissue swelling/inflammation/restriction, improving soft tissue extensibility and allowing for proper ROM for normal function with self care, mobility, lifting and ambulation.      Modalities:  Vaso x 15'    Charges:  Timed Code Treatment Minutes: 39'   Total Treatment Minutes: 60'   1225pm-210pm    [] EVAL (LOW) 95370 (typically 20 minutes face-to-face)  [] EVAL (MOD) 67948 (typically 30 minutes face-to-face)  [] EVAL (HIGH) 16949 (typically 45 minutes face-to-face)  [] RE-EVAL   [x] ZO(52334) x 1   [] IONTO  [] NMR (05392) x     [x] VASO  [x] Manual (40461) x 1   [] Other:  [x] TA x 1     [] Mech Traction (42675)  [] ES(attended) (63947)      [] ES (un) (68095):     GOALS:   Patient stated goal: Reduce pain in knee with ADL's    [] Progressing: [] Met: [] Not Met: [] Adjusted    Therapist goals for Patient:   Short Term Goals: To be achieved in: 2 weeks  1. Independent in HEP and progression per patient tolerance, in order to prevent re-injury. [] Progressing: [] Met: [] Not Met: [] Adjusted   2. Patient will have a decrease in pain to facilitate improvement in movement, function, and ADLs as indicated by Functional Deficits. [] Progressing: [] Met: [] Not Met: [] Adjusted    Long Term Goals: To be achieved in: 12 weeks  1. Disability index score of 30% or less for the LEFS to assist with reaching prior level of function. [] Progressing: [] Met: [] Not Met: [] Adjusted  2. Patient will demonstrate increased AROM to 0-120 or greater to allow for proper joint functioning as indicated by patients Functional Deficits. [] Progressing: [] Met: [] Not Met: [] Adjusted  3. Patient will demonstrate an increase in Strength to 5/5 in BLE to allow for proper functional mobility as indicated by patients Functional Deficits. [] Progressing: [] Met: [] Not Met: [] Adjusted  4. Patient will return to all ADL and other functional activities without increased symptoms or restriction. [] Progressing: [] Met: [] Not Met: [] Adjusted  5. Patient will ascend/descend one flight of stairs without increased pain in left knee. (patient specific functional goal)    [] Progressing: [] Met: [] Not Met: [] Adjusted       Overall Progression Towards Functional goals/ Treatment Progress Update:  [] Patient is progressing as expected towards functional goals listed. [] Progression is slowed due to complexities/Impairments listed.   [] Progression has

## 2020-02-04 NOTE — PROGRESS NOTES
[facility] / [facility] [place] ([facility]) [address] [facility], [address]    Acknowledgment of Informed Consent for Surgical or Medical Procedure and Sedation  I agree to allow doctor(s) Sharon Sheehan and his/her associates or assistants, including residents and/or other qualified medical practitioner to perform the following medical treatment or procedure and to administer or direct the administration of sedation as necessary:  Procedure(s): LEFT KNEE MANIPULATION  My doctor has explained the following regarding the proposed procedure:   the explanation of the procedure   the benefits of the procedure   the potential problems that might occur during recuperation   the risks and side effects of the procedure which could include but are not limited to severe blood loss, infection, stroke or death   the benefits, risks and side effect of alternative procedures including the consequences of declining this procedure or any alternative procedures   the likelihood of achieving satisfactory results. I acknowledge no guarantee or assurance has been made to me regarding the results. I understand that during the course of this treatment/procedure, unforeseen conditions can occur which require an additional or different procedure. I agree to allow my physician or assistants to perform such extension of the original procedure as they may find necessary. I understand that sedation will often result in temporary impairment of memory and fine motor skills and that sedation can occasionally progress to a state of deep sedation or general anesthesia. I understand the risks of anesthesia for surgery include, but are not limited to, sore throat, hoarseness, injury to face, mouth, or teeth; nausea; headache; injury to blood vessels or nerves; death, brain damage, or paralysis.     I understand that if I have a Limitation of Treatment order in effect during my hospitalization, the order may or may not be in effect

## 2020-02-05 ENCOUNTER — ANESTHESIA (OUTPATIENT)
Dept: POSTOP/PACU | Age: 75
End: 2020-02-05

## 2020-02-05 ENCOUNTER — HOSPITAL ENCOUNTER (OUTPATIENT)
Dept: POSTOP/PACU | Age: 75
Discharge: HOME | End: 2020-02-05
Payer: MEDICARE

## 2020-02-05 ENCOUNTER — ANESTHESIA EVENT (OUTPATIENT)
Dept: POSTOP/PACU | Age: 75
End: 2020-02-05

## 2020-02-05 ENCOUNTER — HOSPITAL ENCOUNTER (OUTPATIENT)
Dept: PHYSICAL THERAPY | Age: 75
Setting detail: THERAPIES SERIES
Discharge: HOME | End: 2020-02-05
Payer: MEDICARE

## 2020-02-05 VITALS
OXYGEN SATURATION: 94 % | WEIGHT: 180 LBS | DIASTOLIC BLOOD PRESSURE: 87 MMHG | SYSTOLIC BLOOD PRESSURE: 165 MMHG | HEART RATE: 52 BPM | BODY MASS INDEX: 24.38 KG/M2 | TEMPERATURE: 97.1 F | HEIGHT: 72 IN | RESPIRATION RATE: 14 BRPM

## 2020-02-05 VITALS — DIASTOLIC BLOOD PRESSURE: 78 MMHG | SYSTOLIC BLOOD PRESSURE: 137 MMHG

## 2020-02-05 PROCEDURE — 3700000001 HC ADD 15 MINUTES (ANESTHESIA)

## 2020-02-05 PROCEDURE — 3700000000 HC ANESTHESIA ATTENDED CARE

## 2020-02-05 PROCEDURE — 7100000010 HC PHASE II RECOVERY - FIRST 15 MIN

## 2020-02-05 PROCEDURE — 97110 THERAPEUTIC EXERCISES: CPT

## 2020-02-05 PROCEDURE — 6360000002 HC RX W HCPCS: Performed by: ANESTHESIOLOGY

## 2020-02-05 PROCEDURE — 6360000002 HC RX W HCPCS: Performed by: NURSE ANESTHETIST, CERTIFIED REGISTERED

## 2020-02-05 PROCEDURE — 97530 THERAPEUTIC ACTIVITIES: CPT

## 2020-02-05 PROCEDURE — 97016 VASOPNEUMATIC DEVICE THERAPY: CPT

## 2020-02-05 PROCEDURE — 2580000003 HC RX 258: Performed by: ORTHOPAEDIC SURGERY

## 2020-02-05 PROCEDURE — 3600000500 HC JOINT MANIPULATION

## 2020-02-05 PROCEDURE — 7100000000 HC PACU RECOVERY - FIRST 15 MIN

## 2020-02-05 PROCEDURE — 6370000000 HC RX 637 (ALT 250 FOR IP): Performed by: ANESTHESIOLOGY

## 2020-02-05 PROCEDURE — 7100000011 HC PHASE II RECOVERY - ADDTL 15 MIN

## 2020-02-05 PROCEDURE — 27570 FIXATION OF KNEE JOINT: CPT

## 2020-02-05 PROCEDURE — 7100000001 HC PACU RECOVERY - ADDTL 15 MIN

## 2020-02-05 PROCEDURE — 2500000003 HC RX 250 WO HCPCS: Performed by: NURSE ANESTHETIST, CERTIFIED REGISTERED

## 2020-02-05 RX ORDER — PROPOFOL 10 MG/ML
INJECTION, EMULSION INTRAVENOUS PRN
Status: DISCONTINUED | OUTPATIENT
Start: 2020-02-05 | End: 2020-02-05 | Stop reason: SDUPTHER

## 2020-02-05 RX ORDER — ONDANSETRON 2 MG/ML
4 INJECTION INTRAMUSCULAR; INTRAVENOUS
Status: DISCONTINUED | OUTPATIENT
Start: 2020-02-05 | End: 2020-02-05 | Stop reason: HOSPADM

## 2020-02-05 RX ORDER — OXYCODONE HYDROCHLORIDE AND ACETAMINOPHEN 5; 325 MG/1; MG/1
1 TABLET ORAL EVERY 6 HOURS PRN
Qty: 6 TABLET | Refills: 0 | Status: SHIPPED | OUTPATIENT
Start: 2020-02-05 | End: 2020-02-08

## 2020-02-05 RX ORDER — FENTANYL CITRATE 50 UG/ML
25 INJECTION, SOLUTION INTRAMUSCULAR; INTRAVENOUS EVERY 5 MIN PRN
Status: DISCONTINUED | OUTPATIENT
Start: 2020-02-05 | End: 2020-02-06 | Stop reason: HOSPADM

## 2020-02-05 RX ORDER — OXYCODONE HYDROCHLORIDE AND ACETAMINOPHEN 5; 325 MG/1; MG/1
1 TABLET ORAL PRN
Status: COMPLETED | OUTPATIENT
Start: 2020-02-05 | End: 2020-02-05

## 2020-02-05 RX ORDER — FENTANYL CITRATE 50 UG/ML
INJECTION, SOLUTION INTRAMUSCULAR; INTRAVENOUS PRN
Status: DISCONTINUED | OUTPATIENT
Start: 2020-02-05 | End: 2020-02-05 | Stop reason: SDUPTHER

## 2020-02-05 RX ORDER — DIPHENHYDRAMINE HYDROCHLORIDE 50 MG/ML
12.5 INJECTION INTRAMUSCULAR; INTRAVENOUS
Status: DISCONTINUED | OUTPATIENT
Start: 2020-02-05 | End: 2020-02-05 | Stop reason: HOSPADM

## 2020-02-05 RX ORDER — LIDOCAINE HYDROCHLORIDE 20 MG/ML
INJECTION, SOLUTION INTRAVENOUS PRN
Status: DISCONTINUED | OUTPATIENT
Start: 2020-02-05 | End: 2020-02-05 | Stop reason: SDUPTHER

## 2020-02-05 RX ORDER — MEPERIDINE HYDROCHLORIDE 25 MG/ML
12.5 INJECTION INTRAMUSCULAR; INTRAVENOUS; SUBCUTANEOUS EVERY 5 MIN PRN
Status: DISCONTINUED | OUTPATIENT
Start: 2020-02-05 | End: 2020-02-06 | Stop reason: HOSPADM

## 2020-02-05 RX ORDER — SODIUM CHLORIDE, SODIUM LACTATE, POTASSIUM CHLORIDE, CALCIUM CHLORIDE 600; 310; 30; 20 MG/100ML; MG/100ML; MG/100ML; MG/100ML
INJECTION, SOLUTION INTRAVENOUS CONTINUOUS
Status: DISCONTINUED | OUTPATIENT
Start: 2020-02-05 | End: 2020-02-06 | Stop reason: HOSPADM

## 2020-02-05 RX ORDER — PROCHLORPERAZINE EDISYLATE 5 MG/ML
5 INJECTION INTRAMUSCULAR; INTRAVENOUS
Status: DISCONTINUED | OUTPATIENT
Start: 2020-02-05 | End: 2020-02-05 | Stop reason: HOSPADM

## 2020-02-05 RX ORDER — OXYCODONE HYDROCHLORIDE AND ACETAMINOPHEN 5; 325 MG/1; MG/1
2 TABLET ORAL PRN
Status: COMPLETED | OUTPATIENT
Start: 2020-02-05 | End: 2020-02-05

## 2020-02-05 RX ORDER — FENTANYL CITRATE 50 UG/ML
50 INJECTION, SOLUTION INTRAMUSCULAR; INTRAVENOUS EVERY 5 MIN PRN
Status: DISCONTINUED | OUTPATIENT
Start: 2020-02-05 | End: 2020-02-06 | Stop reason: HOSPADM

## 2020-02-05 RX ORDER — LABETALOL 20 MG/4 ML (5 MG/ML) INTRAVENOUS SYRINGE
5 EVERY 10 MIN PRN
Status: DISCONTINUED | OUTPATIENT
Start: 2020-02-05 | End: 2020-02-06 | Stop reason: HOSPADM

## 2020-02-05 RX ORDER — GLYCOPYRROLATE 1 MG/5 ML
SYRINGE (ML) INTRAVENOUS PRN
Status: DISCONTINUED | OUTPATIENT
Start: 2020-02-05 | End: 2020-02-05 | Stop reason: SDUPTHER

## 2020-02-05 RX ORDER — HYDRALAZINE HYDROCHLORIDE 20 MG/ML
5 INJECTION INTRAMUSCULAR; INTRAVENOUS EVERY 10 MIN PRN
Status: DISCONTINUED | OUTPATIENT
Start: 2020-02-05 | End: 2020-02-06 | Stop reason: HOSPADM

## 2020-02-05 RX ADMIN — Medication 0.2 MG: at 07:15

## 2020-02-05 RX ADMIN — HYDROMORPHONE HYDROCHLORIDE 0.5 MG: 1 INJECTION, SOLUTION INTRAMUSCULAR; INTRAVENOUS; SUBCUTANEOUS at 07:51

## 2020-02-05 RX ADMIN — SODIUM CHLORIDE, SODIUM LACTATE, POTASSIUM CHLORIDE, AND CALCIUM CHLORIDE: 600; 310; 30; 20 INJECTION, SOLUTION INTRAVENOUS at 07:14

## 2020-02-05 RX ADMIN — HYDROMORPHONE HYDROCHLORIDE 0.5 MG: 1 INJECTION, SOLUTION INTRAMUSCULAR; INTRAVENOUS; SUBCUTANEOUS at 08:13

## 2020-02-05 RX ADMIN — LIDOCAINE HYDROCHLORIDE 50 MG: 20 INJECTION, SOLUTION INTRAVENOUS at 07:22

## 2020-02-05 RX ADMIN — PROPOFOL 100 MG: 10 INJECTION, EMULSION INTRAVENOUS at 07:17

## 2020-02-05 RX ADMIN — HYDROMORPHONE HYDROCHLORIDE 0.5 MG: 1 INJECTION, SOLUTION INTRAMUSCULAR; INTRAVENOUS; SUBCUTANEOUS at 08:08

## 2020-02-05 RX ADMIN — SODIUM CHLORIDE, SODIUM LACTATE, POTASSIUM CHLORIDE, AND CALCIUM CHLORIDE: 600; 310; 30; 20 INJECTION, SOLUTION INTRAVENOUS at 06:34

## 2020-02-05 RX ADMIN — LIDOCAINE HYDROCHLORIDE 100 MG: 20 INJECTION, SOLUTION INTRAVENOUS at 07:17

## 2020-02-05 RX ADMIN — FENTANYL CITRATE 50 MCG: 50 INJECTION INTRAMUSCULAR; INTRAVENOUS at 07:30

## 2020-02-05 RX ADMIN — LIDOCAINE HYDROCHLORIDE 100 MG: 20 INJECTION, SOLUTION INTRAVENOUS at 07:20

## 2020-02-05 RX ADMIN — FENTANYL CITRATE 50 MCG: 50 INJECTION INTRAMUSCULAR; INTRAVENOUS at 07:15

## 2020-02-05 RX ADMIN — HYDROMORPHONE HYDROCHLORIDE 0.5 MG: 1 INJECTION, SOLUTION INTRAMUSCULAR; INTRAVENOUS; SUBCUTANEOUS at 07:57

## 2020-02-05 RX ADMIN — OXYCODONE HYDROCHLORIDE AND ACETAMINOPHEN 1 TABLET: 5; 325 TABLET ORAL at 09:30

## 2020-02-05 ASSESSMENT — PAIN DESCRIPTION - ORIENTATION
ORIENTATION: LEFT

## 2020-02-05 ASSESSMENT — PAIN SCALES - GENERAL
PAINLEVEL_OUTOF10: 5
PAINLEVEL_OUTOF10: 7
PAINLEVEL_OUTOF10: 5
PAINLEVEL_OUTOF10: 8
PAINLEVEL_OUTOF10: 6
PAINLEVEL_OUTOF10: 7
PAINLEVEL_OUTOF10: 7

## 2020-02-05 ASSESSMENT — PAIN DESCRIPTION - FREQUENCY: FREQUENCY: CONTINUOUS

## 2020-02-05 ASSESSMENT — PAIN DESCRIPTION - PAIN TYPE
TYPE: SURGICAL PAIN
TYPE: ACUTE PAIN;SURGICAL PAIN
TYPE: SURGICAL PAIN

## 2020-02-05 ASSESSMENT — PAIN DESCRIPTION - PROGRESSION: CLINICAL_PROGRESSION: GRADUALLY WORSENING

## 2020-02-05 ASSESSMENT — PAIN DESCRIPTION - DESCRIPTORS
DESCRIPTORS: ACHING;DISCOMFORT
DESCRIPTORS: ACHING

## 2020-02-05 ASSESSMENT — PAIN - FUNCTIONAL ASSESSMENT: PAIN_FUNCTIONAL_ASSESSMENT: 0-10

## 2020-02-05 ASSESSMENT — PAIN DESCRIPTION - LOCATION
LOCATION: KNEE

## 2020-02-05 ASSESSMENT — PAIN DESCRIPTION - ONSET: ONSET: ON-GOING

## 2020-02-05 NOTE — PROGRESS NOTES
Patient awakening and oral airway dc'd without problems. Patient stated knee is really hurting and he needs pain medication. Will medicate ASAP. Status otherwise stable and Dr. Toya Rocha stopped by to update patient.

## 2020-02-05 NOTE — ANESTHESIA PRE PROCEDURE
Results   Component Value Date    WBC 12.8 01/16/2020    RBC 4.47 01/16/2020    HGB 13.4 01/16/2020    HCT 40.0 01/16/2020    MCV 89.6 01/16/2020    RDW 12.9 01/16/2020     01/16/2020       CMP:   Lab Results   Component Value Date     01/16/2020    K 4.5 01/16/2020     01/16/2020    CO2 20 01/16/2020    BUN 22 01/16/2020    CREATININE 1.1 01/16/2020    GFRAA >60 01/16/2020    LABGLOM >60 01/16/2020    GLUCOSE 191 01/16/2020    PROT 7.1 01/15/2020    CALCIUM 8.6 01/16/2020    BILITOT 0.6 01/15/2020    ALKPHOS 88 01/15/2020    AST 20 01/15/2020    ALT 19 01/15/2020       POC Tests: No results for input(s): POCGLU, POCNA, POCK, POCCL, POCBUN, POCHEMO, POCHCT in the last 72 hours. Coags:   Lab Results   Component Value Date    PROTIME 10.9 01/15/2020    INR 0.94 01/15/2020    APTT 30.2 01/15/2020       HCG (If Applicable): No results found for: PREGTESTUR, PREGSERUM, HCG, HCGQUANT     ABGs: No results found for: PHART, PO2ART, AUA6FSK, WFV3STC, BEART, A2SENPBB     Type & Screen (If Applicable):  No results found for: LABABO, LABRH    Anesthesia Evaluation    Airway: Mallampati: II  TM distance: >3 FB   Neck ROM: full  Mouth opening: > = 3 FB Dental:          Pulmonary:       (-) COPD, asthma and sleep apnea                           Cardiovascular:  Exercise tolerance: good (>4 METS),   (+) hypertension:,     (-) past MI and  angina                Neuro/Psych:      (-) seizures and CVA           GI/Hepatic/Renal:   (+) GERD: well controlled,           Endo/Other:        (-) diabetes mellitus               Abdominal:           Vascular:                                        Anesthesia Plan      MAC     ASA 2     (-npo MN  -denies any cardiac history; no new chest pain or palp. Last surgery on left knee went well)  Induction: intravenous. MIPS: Postoperative opioids intended. Anesthetic plan and risks discussed with patient. Plan discussed with CRNA.     Attending anesthesiologist reviewed

## 2020-02-05 NOTE — PLAN OF CARE
Radha Mckeon    :  1945  MRN: [id number]  Restrictions/Precautions:    Medical/Treatment Diagnosis Information:  · Diagnosis: M17.12 (ICD-10-CM) - Primary osteoarthritis of left knee  · Treatment Diagnosis: M25.562 L Knee Pain; M25.662 L Knee Stiffness; R53.1 Weakness  · S/P  Left TKR + MCL Repair  · DOS: 1-15-20  · Medications: Percocet 1 tab at night; ASA BID; ; Advil PRN  Insurance/Certification information:  PT Insurance Information: PT BENEFITS 2020 FACILITY/ MEDICARE PRIMARY/ EFFECTIVE 16/ ACTIVE/ PAYS 80%/ NO VISIT LIMIT MED NEC/ 0 AUTH/ [employer] SECONDARY INS/ 20 PAG  Physician Information:  Referring Practitioner: Jayashree Silveira  Has the plan of care been signed (Y/N):        [x]  Yes  []  No     Date of Patient follow up with Physician: 6,12 weeks PO  Patient seen in consultation with Dr. Jayashree Silveira who established the initial/subsequent treatment protocol. 20: ok straight line ROM, given medrol dose pack, ROM goal is  at 3 weeks post op  20: saw MD upstairs for 3-week check and x-ray; HANS tomorrow    Is this a Progress Report:     [x]  Yes  []  No     If Yes:  Date Range for reporting period:  Beginnin20  Endin20    Progress report will be due (10 Rx or 30 days whichever is less): 79      Recertification will be due (POC Duration  / 90 days whichever is less): 20       Visit # Insurance Allowable Auth Required   6 MEDICARE []  Yes [x]  No      OUTCOME MEASURE DATE DEFICIT   LEFS 20 pre-op 41% Deficit   LEFS 20 83% Deficit   LEFS 20 61% Deficit   Patient given satisfaction survey? [] Yes   [x] No       Latex Allergy:  [x]NO      []YES  Preferred Language for Healthcare:   [x]English       []other:    Pain level:  5-6/10     SUBJECTIVE:  Patient presents to PT 3 week PO from Left TKR + MCL Repair and following a HANS this morning. Notes his knee is sore from HANS and feels discomfort throughout quad and also behind knee when \"going into hyperextension. CKC       NPV  Wall sits     Step ups       Squatting     NPVSL DL               PRE       Extension     Flexion     Leg Press       Cable Column               Balance       Tandem Stance     Tilt board       SLS      Biodex balance               Other       Treadmill       NPV     Manual Interventions     NPV   Patient have access to gym? [] Yes  [x] No  Patient have equipment at home? [] Yes  [x] No       Patient Education:  1/14/20: Educated patient on all pre-op and post-op instructions including but not limited to R.I.C.E., post-op HEP, DVT prevention, warning signs of infection and DVT, activity limitations. 1/17/20: extensive education given to patient and wife regarding PO care and restrictions of surgery; provided with OOT packet  1/23/20: updated and reviewed HEP; emphasized KF OP program with goal of 60 minutes/day at end range (see new HEP sheet in media)  2/5/20: discussed post-HANS protocol; educated on this date about KF OP Program; instructions given to perform KF ROM stretches with 60 minutes daily of TERT - goal of 6 sessions per day of 10 minutes. HEP:  Patient instructed on HEP on this date with handout provided and all questions answered. Patient was instructed to contact PT with any complications or questions about HEP moving forward. Patient verbally stated he understood. Updated 1/23/20     Therapeutic Exercise and NMR EXR  [x] (45569) Provided verbal/tactile cueing for activities related to strengthening, flexibility, endurance, ROM for improvements in LE, proximal hip, and core control with self care, mobility, lifting, ambulation. [x] (20213) Provided verbal/tactile cueing for activities related to improving balance, coordination, kinesthetic sense, posture, motor skill, proprioception  to assist with LE, proximal hip, and core control in self care, mobility, lifting, ambulation and eccentric single leg control.      NMR and Therapeutic Activities:    [x] (37089 or 43699) Provided verbal/tactile cueing for activities related to improving balance, coordination, kinesthetic sense, posture, motor skill, proprioception and motor activation to allow for proper function of core, proximal hip and LE with self care and ADLs  [] (24132) Gait Re-education- Provided training and instruction to the patient for proper LE, core and proximal hip recruitment and positioning and eccentric body weight control with ambulation re-education including up and down stairs     Home Exercise Program:    [x] (45699) Reviewed/Progressed HEP activities related to strengthening, flexibility, endurance, ROM of core, proximal hip and LE for functional self-care, mobility, lifting and ambulation/stair navigation   [] (80145)Reviewed/Progressed HEP activities related to improving balance, coordination, kinesthetic sense, posture, motor skill, proprioception of core, proximal hip and LE for self care, mobility, lifting, and ambulation/stair navigation      Manual Treatments:  PROM / STM / Oscillations-Mobs:  G-I, II, III, IV (PA's, Inf., Post.)  [x] (07349) Provided manual therapy to mobilize LE, proximal hip and/or LS spine soft tissue/joints for the purpose of modulating pain, promoting relaxation,  increasing ROM, reducing/eliminating soft tissue swelling/inflammation/restriction, improving soft tissue extensibility and allowing for proper ROM for normal function with self care, mobility, lifting and ambulation.      Modalities:  Vaso x 15' with towel prop x 10'    Charges:  Timed Code Treatment Minutes: 39'   Total Treatment Minutes: 60'   1030am-1150am    [] EVAL (LOW) 92821 (typically 20 minutes face-to-face)  [] EVAL (MOD) 17929 (typically 30 minutes face-to-face)  [] EVAL (HIGH) 70507 (typically 45 minutes face-to-face)  [] RE-EVAL   [x] FT(35950) x 2   [] IONTO  [] NMR (10259) x     [x] VASO  [] Manual (80649) x 1   [] Other:  [x] TA x 1     [] Mech Traction (03396)  [] ES(attended) (90762)      [] ES (un)

## 2020-02-05 NOTE — PROGRESS NOTES
PACU Transfer to [facility]    Vitals:    02/05/20 0845   BP: (!) 157/71   Pulse: 54   Resp: 15   Temp: 97.5 °F (36.4 °C)   SpO2: 96%         Intake/Output Summary (Last 24 hours) at 2/5/2020 0902  Last data filed at 2/5/2020 0845  Gross per 24 hour   Intake 610 ml   Output 0 ml   Net 610 ml       Pain assessment:  Pain tolerable in left knee at a level 5. Status stable at this time.   Pain Level: 5    Patient transferred to care of TANYA RN.    2/5/2020 9:02 AM

## 2020-02-05 NOTE — ANESTHESIA POSTPROCEDURE EVALUATION
Department of Anesthesiology  Postprocedure Note    Patient: Pool Keating  MRN: [id number]  YOB: 1945  Date of evaluation: 2/5/2020  Time:  8:58 AM     Procedure Summary     Date:  02/05/20 Room / Location:  [facility] PACU    Anesthesia Start:  0713 Anesthesia Stop:  8864    Procedure:  JOINT MANIPULATION Diagnosis:      Scheduled Providers:   Responsible Provider:  Franklin Contreras MD    Anesthesia Type:  MAC ASA Status:  2          Anesthesia Type: No value filed. Karin Phase I: Karin Score: 10    Karin Phase II:      Last vitals: Reviewed and per EMR flowsheets.        Anesthesia Post Evaluation    Patient location during evaluation: PACU  Patient participation: complete - patient participated  Level of consciousness: awake  Pain score: 0  Airway patency: patent  Nausea & Vomiting: no nausea and no vomiting  Complications: no  Cardiovascular status: hemodynamically stable  Respiratory status: acceptable  Hydration status: euvolemic

## 2020-02-06 ENCOUNTER — TELEPHONE (OUTPATIENT)
Dept: ORTHOPEDIC SURGERY | Age: 75
End: 2020-02-06

## 2020-02-06 ENCOUNTER — HOSPITAL ENCOUNTER (OUTPATIENT)
Dept: PHYSICAL THERAPY | Age: 75
Setting detail: THERAPIES SERIES
Discharge: HOME | End: 2020-02-06
Payer: MEDICARE

## 2020-02-06 PROCEDURE — 97016 VASOPNEUMATIC DEVICE THERAPY: CPT

## 2020-02-06 PROCEDURE — 97110 THERAPEUTIC EXERCISES: CPT

## 2020-02-06 PROCEDURE — 97140 MANUAL THERAPY 1/> REGIONS: CPT

## 2020-02-06 PROCEDURE — 97530 THERAPEUTIC ACTIVITIES: CPT

## 2020-02-06 RX ORDER — METHYLPREDNISOLONE 4 MG/1
TABLET ORAL
Qty: 1 KIT | Refills: 0 | Status: SHIPPED | OUTPATIENT
Start: 2020-02-06 | End: 2020-02-12

## 2020-02-06 NOTE — FLOWSHEET NOTE
1200pm-150pm    [] EVAL (LOW) 90703 (typically 20 minutes face-to-face)  [] EVAL (MOD) 39391 (typically 30 minutes face-to-face)  [] EVAL (HIGH) 90681 (typically 45 minutes face-to-face)  [] RE-EVAL   [x] GZ(57368) x 1   [] IONTO  [] NMR (22363) x     [x] VASO  [x] Manual (77535) x 1   [] Other:  [x] TA x 1     [] Mech Traction (51654)  [] ES(attended) (59326)      [] ES (un) (48179):     GOALS:   Patient stated goal: Reduce pain in knee with ADL's    [] Progressing: [] Met: [] Not Met: [] Adjusted    Therapist goals for Patient:   Short Term Goals: To be achieved in: 2 weeks  1. Independent in HEP and progression per patient tolerance, in order to prevent re-injury. [] Progressing: [x] Met: [] Not Met: [] Adjusted   2. Patient will have a decrease in pain to facilitate improvement in movement, function, and ADLs as indicated by Functional Deficits. [x] Progressing: [] Met: [] Not Met: [] Adjusted    Long Term Goals: To be achieved in: 12 weeks  1. Disability index score of 30% or less for the LEFS to assist with reaching prior level of function. [x] Progressing: [] Met: [] Not Met: [] Adjusted  2. Patient will demonstrate increased AROM to 0-120 or greater to allow for proper joint functioning as indicated by patients Functional Deficits. [x] Progressing: [] Met: [] Not Met: [] Adjusted  3. Patient will demonstrate an increase in Strength to 5/5 in BLE to allow for proper functional mobility as indicated by patients Functional Deficits. [x] Progressing: [] Met: [] Not Met: [] Adjusted  4. Patient will return to all ADL and other functional activities without increased symptoms or restriction. [x] Progressing: [] Met: [] Not Met: [] Adjusted  5.  Patient will ascend/descend one flight of stairs without increased pain in left knee. (patient specific functional goal)    [x] Progressing: [] Met: [] Not Met: [] Adjusted       Overall Progression Towards Functional goals/ Treatment Progress Update:  []

## 2020-02-07 ENCOUNTER — HOSPITAL ENCOUNTER (OUTPATIENT)
Dept: PHYSICAL THERAPY | Age: 75
Setting detail: THERAPIES SERIES
Discharge: HOME | End: 2020-02-07
Payer: MEDICARE

## 2020-02-07 PROCEDURE — 97016 VASOPNEUMATIC DEVICE THERAPY: CPT

## 2020-02-07 PROCEDURE — 97110 THERAPEUTIC EXERCISES: CPT

## 2020-02-07 PROCEDURE — 97530 THERAPEUTIC ACTIVITIES: CPT

## 2020-02-07 PROCEDURE — 97140 MANUAL THERAPY 1/> REGIONS: CPT

## 2020-02-07 NOTE — FLOWSHEET NOTE
Progressing: [] Met: [] Not Met: [] Adjusted       Overall Progression Towards Functional goals/ Treatment Progress Update:  [] Patient is progressing as expected towards functional goals listed. [x] Progression is slowed due to complexities/Impairments listed: increased KF ROM stiffness requiring HANS on 2/5/20. [] Progression has been slowed due to co-morbidities. [] Plan just implemented, too soon to assess goals progression <30days   [] Goals require adjustment due to lack of progress  [] Patient is not progressing as expected and requires additional follow up with physician  [] Other    Prognosis for POC: [x] Good [] Fair  [] Poor    Patient requires continued skilled intervention: [x] Yes  [] No    Treatment/Activity Tolerance:  [] Patient able to complete treatment  [x] Patient limited by fatigue  [x] Patient limited by pain     [] Patient limited by other medical complications  [x] Other:  Still limited at end range by quad pain/tightness. Increased 5 degrees to 95 today compared to yesterday. Was very TTP along RF muscle today and only tolerated light pressure with IASTM compared to yesterday. Instructed patient in detail about heat and STM to quad this weekend to help with quad tightness. Also added HS curls today to try and help create reciprocal inhibition and discussed with patient how to try this over the weekend. Will return to [facility] Tuesday for follow up and possible MD check pending progress. Patient still requires further therapy to address decreased ROM, decreased strength, decreased balance, increased pain, increased swelling and impaired gait mechanics that is causing a decrease in their overall functional mobility in order to safely return patient to their PLOF.         PLAN: Continue per initial POC: 2x/week for 12 weeks PO  [x] Continue per plan of care [] Alter current plan (see comments above)  [] Plan of care initiated [] Hold pending MD visit [] Discharge    HANS Protocol / KF ROM

## 2020-02-11 ENCOUNTER — HOSPITAL ENCOUNTER (OUTPATIENT)
Dept: PHYSICAL THERAPY | Age: 75
Setting detail: THERAPIES SERIES
Discharge: HOME | End: 2020-02-11
Payer: MEDICARE

## 2020-02-11 PROCEDURE — 97110 THERAPEUTIC EXERCISES: CPT

## 2020-02-11 PROCEDURE — 97016 VASOPNEUMATIC DEVICE THERAPY: CPT

## 2020-02-11 PROCEDURE — 97530 THERAPEUTIC ACTIVITIES: CPT

## 2020-02-11 PROCEDURE — 97140 MANUAL THERAPY 1/> REGIONS: CPT

## 2020-02-11 NOTE — FLOWSHEET NOTE
[]other:    Pain level:  5-6/10     SUBJECTIVE:  Patient presents to PT 4 weeks PO from Left TKR + MCL Repair and 6 days s/p HANS. Doing a bit better today. Notes his knee is pain-free and has no instability walking without a walker. Only complaint continues to be quad soreness as was last week. Thinks he got between 100-105 in PT yesterday. OBJECTIVE:        2/11/20  ROM PROM AROM Overpressure Comment     L R L R L R     Flexion   107 best  107 today   90 130     Cold  ERMI   Extension +2    +5                                                2/5/20  Strength L R Comment   Quad 3+ 5 QS   Hamstring 5 5     Gastroc NT NT     Hip  flexion 4+ 4+     Hip abd NT NT                            2/5/20  Special Test Results/Comment   Homans Neg   Temperature Neg      2/6/20  Girth L R   Mid Patella 39.6 36.5   Suprapatellar 39.4 36.0   5cm above 40.3 38.0   15cm above 45.6 47.0      Reflexes/Sensation:               [x]? Dermatomes/Myotomes intact               [x]? Reflexes equal and normal bilaterally              []?Other:     Joint mobility:                [x]? Normal                       []?Hypo              []?Hyper     Palpation:  TTP in rectus femoris 2/5/20     Functional Mobility/Transfers: Ind     Posture: Varus     Bandages/Dressings/Incisions:  Steri strips in placed, healing well 2/5/20    Gait: (include devices/WB status) Ambulates without AD - mild stiffness in KF in swing phase 2/11/20     Orthopedic Special Tests:      TEST INITIAL  1-14-20  Pre-op   GOAL   SINGLE LEG STANCE TIME L: 10\"     R: 10\"   >25 SECONDS   30 Second sit>stand 22 Reps   >12 reps          RESTRICTIONS/PRECAUTIONS: TKR + MCL Protocol:      Exercises/Interventions:  Exercise/Equipment Resistance/Repetitions Other comments   Stretching       Hamstring 5 x 30\" Prop   Hip Flexor     ITB     Figure 4     Quad 5 x 30\" Prone prop   Inclined Calf 5 x 30\"            ROM       EOB Self-Assist     Sheet Pull     Wall Slide       Recumbent Bike 10' AAROM   ERMI 5 x 1' + 1'   Hang Weights 10' Prop                                      Isometrics       Quad sets 10 x 10\"                 SLR       Prone     Abduction     Adducton     SLR+               Glutes       Bridges     Supine Clams     S/L Clams     Side Stepping      Monster walks              CKC       Calf raises 3 x 10   Wall sits     Step ups      Squatting     CC TKE 3 x 10 4pl   SL DL               PRE       Extension 3 x 10 RANGE: LAQ, 0#   Flexion 3 x 10 RANGE: Availl, Prone   Leg Press       Cable Column               Balance       Tandem Stance     Tilt board       SLS      Biodex balance               Other       Treadmill       Gait Training 5 laps Cone walking, no AD        Manual Interventions     IASTM 3' Quad / Rectus Femoris      Patient have access to gym? [] Yes  [x] No  Patient have equipment at home? [] Yes  [x] No       Patient Education:  1/14/20: Educated patient on all pre-op and post-op instructions including but not limited to R.I.C.E., post-op HEP, DVT prevention, warning signs of infection and DVT, activity limitations. 1/17/20: extensive education given to patient and wife regarding PO care and restrictions of surgery; provided with OOT packet  1/23/20: updated and reviewed HEP; emphasized KF OP program with goal of 60 minutes/day at end range (see new HEP sheet in media)  2/5/20: discussed post-HANS protocol; educated on this date about KF OP Program; instructions given to perform KF ROM stretches with 60 minutes daily of TERT - goal of 6 sessions per day of 10 minutes. 2/6/20: demonstrated stool scoot technique for KF OP      HEP:  Patient instructed on HEP on this date with handout provided and all questions answered. Patient was instructed to contact PT with any complications or questions about HEP moving forward. Patient verbally stated he understood.  Updated 1/23/20     Therapeutic Exercise and NMR EXR  [x] (11009) Provided verbal/tactile cueing for activities Modalities:  Vaso x 15' with prop x 10'    Charges:  Timed Code Treatment Minutes: 39'   Total Treatment Minutes: 60'   1200pm-135pm    [] EVAL (LOW) 20236 (typically 20 minutes face-to-face)  [] EVAL (MOD) 46922 (typically 30 minutes face-to-face)  [] EVAL (HIGH) 02071 (typically 45 minutes face-to-face)  [] RE-EVAL   [x] BZ(59013) x 1   [] IONTO  [] NMR (19949) x     [x] VASO  [x] Manual (84711) x 1   [] Other:  [x] TA x 1     [] Mech Traction (67212)  [] ES(attended) (20568)      [] ES (un) (89762):     GOALS:   Patient stated goal: Reduce pain in knee with ADL's    [x] Progressing: [] Met: [] Not Met: [] Adjusted    Therapist goals for Patient:   Short Term Goals: To be achieved in: 2 weeks  1. Independent in HEP and progression per patient tolerance, in order to prevent re-injury. [] Progressing: [x] Met: [] Not Met: [] Adjusted   2. Patient will have a decrease in pain to facilitate improvement in movement, function, and ADLs as indicated by Functional Deficits. [x] Progressing: [] Met: [] Not Met: [] Adjusted    Long Term Goals: To be achieved in: 12 weeks  1. Disability index score of 30% or less for the LEFS to assist with reaching prior level of function. [x] Progressing: [] Met: [] Not Met: [] Adjusted  2. Patient will demonstrate increased AROM to 0-120 or greater to allow for proper joint functioning as indicated by patients Functional Deficits. [x] Progressing: [] Met: [] Not Met: [] Adjusted  3. Patient will demonstrate an increase in Strength to 5/5 in BLE to allow for proper functional mobility as indicated by patients Functional Deficits. [x] Progressing: [] Met: [] Not Met: [] Adjusted  4. Patient will return to all ADL and other functional activities without increased symptoms or restriction. [x] Progressing: [] Met: [] Not Met: [] Adjusted  5.  Patient will ascend/descend one flight of stairs without increased pain in left knee. (patient specific functional goal)    [x] Progressing: [] Met: [] Not Met: [] Adjusted       Overall Progression Towards Functional goals/ Treatment Progress Update:  [] Patient is progressing as expected towards functional goals listed. [x] Progression is slowed due to complexities/Impairments listed: increased KF ROM stiffness requiring HANS on 2/5/20. [] Progression has been slowed due to co-morbidities. [] Plan just implemented, too soon to assess goals progression <30days   [] Goals require adjustment due to lack of progress  [] Patient is not progressing as expected and requires additional follow up with physician  [] Other    Prognosis for POC: [x] Good [] Fair  [] Poor    Patient requires continued skilled intervention: [x] Yes  [] No    Treatment/Activity Tolerance:  [] Patient able to complete treatment  [x] Patient limited by fatigue  [x] Patient limited by pain     [] Patient limited by other medical complications  [x] Other:  Better tolerance to session and KF today. Reached 107 degrees and was 90 degrees cold - both of which are the best measurements to date for patient. Continues to be pain-free in knee / joint with soreness and restriction only in quadriceps muscle. During IASTM today there were less palpable restriction within muscle but patient was still very TTP during this technique. Patient continues to have decrease toe-off and KF in swing phase during gait training despite constant verbal cues from PT. He tends to rush through cone walking and uses hip abduction or circumduction to avoid stepping over cones. Patient still requires further therapy to address decreased ROM, decreased strength, decreased balance, increased pain, increased swelling and impaired gait mechanics that is causing a decrease in their overall functional mobility in order to safely return patient to their PLOF.         PLAN: Continue per initial POC: 2x/week for 12 weeks PO  [x] Continue per plan of care [] Alter current plan (see comments above)  [] Plan of

## 2020-02-20 ENCOUNTER — HOSPITAL ENCOUNTER (OUTPATIENT)
Dept: PHYSICAL THERAPY | Age: 75
Setting detail: THERAPIES SERIES
Discharge: HOME | End: 2020-02-20
Payer: MEDICARE

## 2020-02-20 PROCEDURE — 97110 THERAPEUTIC EXERCISES: CPT

## 2020-02-20 PROCEDURE — 97530 THERAPEUTIC ACTIVITIES: CPT

## 2020-02-20 NOTE — FLOWSHEET NOTE
The [address] [place] [address] and [facility]      Physical Therapy Daily Treatment Note  Date:  2020    Patient Name:  Estela Nina    :    MRN: [id number]  Restrictions/Precautions:    Medical/Treatment Diagnosis Information:  · Diagnosis: M17.12 (ICD-10-CM) - Primary osteoarthritis of left knee  · Treatment Diagnosis: M25.562 L Knee Pain; M25.662 L Knee Stiffness; R53.1 Weakness  · S/P  Left TKR + MCL Repair  · DOS: 1-15-20  Medications: Insurance/Certification information:  PT Insurance Information: PT BENEFITS  FACILITY/ MEDICARE PRIMARY/ EFFECTIVE 16/ ACTIVE/ PAYS 80%/ NO VISIT LIMIT MED NEC/ 0 AUTH/ [employer] SECONDARY INS/ 20 PAG  Physician Information:  Referring Practitioner: Emiliana Pollard  Has the plan of care been signed (Y/N):        [x]  Yes  []  No     Date of Patient follow up with Physician: 12 weeks PO  Patient seen in consultation with Dr. Emiliana Pollard who established the initial/subsequent treatment protocol. 20: ok straight line ROM, given medrol dose pack, ROM goal is  at 3 weeks post op  20: saw MD upstairs for 3-week check and x-ray; HANS tomorrow  20: Quad pain is expected; will give a second medrol dose pack to help with inflammation  20: doing well, good stability with valgus stress testing, pleased with ROM    Is this a Progress Report:     []  Yes  [x]  No     If Yes:  Date Range for reporting period:  Beginning:   Ending:     Progress report will be due (10 Rx or 30 days whichever is less): 95      Recertification will be due (POC Duration  / 90 days whichever is less): 20       Visit # Insurance Allowable Auth Required   10 MEDICARE []  Yes [x]  No      OUTCOME MEASURE DATE DEFICIT   LEFS 20 pre-op 41% Deficit   LEFS 20 83% Deficit   LEFS 20 61% Deficit   Patient given satisfaction survey?  [] Yes   [x] No       Latex Allergy:  [x]NO      []YES  Preferred Language for Healthcare:   [x]English []other:    Pain level:  5-6/10     SUBJECTIVE:  Patient presents to PT 5 weeks PO from Left TKR + MCL Repair. Doing much better today. No longer on any medication for knee and states he reached 120 degrees in PT this week. Flexibility feels improved and is back to all ADL's and only complaint is onset of clicking noise in knee. OBJECTIVE:        2/20/20  ROM PROM AROM Overpressure Comment     L R L R L R     Flexion   126   115 130     Cold  ERMI   Extension +2    +5                                                2/5/20  Strength L R Comment   Quad 3+ 5 QS   Hamstring 5 5     Gastroc NT NT     Hip  flexion 4+ 4+     Hip abd NT NT                            2/5/20  Special Test Results/Comment   Homans Neg   Temperature Neg      2/6/20  Girth L R   Mid Patella 39.6 36.5   Suprapatellar 39.4 36.0   5cm above 40.3 38.0   15cm above 45.6 47.0      Reflexes/Sensation:               [x]? Dermatomes/Myotomes intact               [x]? Reflexes equal and normal bilaterally              []?Other:     Joint mobility:                [x]? Normal                       []?Hypo              []?Hyper     Palpation:  TTP in rectus femoris 2/5/20     Functional Mobility/Transfers: Ind     Posture: Varus     Bandages/Dressings/Incisions:  Steri strips in placed, healing well 2/5/20    Gait: (include devices/WB status) Ambulates without AD - mild stiffness in KF in swing phase 2/11/20     Orthopedic Special Tests:      TEST INITIAL  1-14-20  Pre-op   GOAL   SINGLE LEG STANCE TIME L: 10\"     R: 10\"   >25 SECONDS   30 Second sit>stand 22 Reps   >12 reps          RESTRICTIONS/PRECAUTIONS: TKR + MCL Protocol:      Exercises/Interventions:  Exercise/Equipment Resistance/Repetitions Other comments   Stretching       Hamstring 5 x 30\" Prop   Hip Flexor     ITB     Figure 4     Quad 5 x 30\" Prone prop   Inclined Calf 5 x 30\"            ROM       EOB Self-Assist     Sheet Pull     Wall Slide       Recumbent Bike 10' Level 2.5   ERMI 5 x 1' + does not return for scheduled/ recommended follow up visits, this note will serve as a discharge from care along with most recent update on progress.

## 2020-06-02 ENCOUNTER — APPOINTMENT (OUTPATIENT)
Dept: PHYSICAL THERAPY | Age: 75
End: 2020-06-02
Payer: MEDICARE

## 2020-06-04 ENCOUNTER — OFFICE VISIT (OUTPATIENT)
Dept: ORTHOPEDIC SURGERY | Age: 75
End: 2020-06-04
Payer: MEDICARE

## 2020-06-04 ENCOUNTER — HOSPITAL ENCOUNTER (OUTPATIENT)
Dept: PHYSICAL THERAPY | Age: 75
Setting detail: THERAPIES SERIES
Discharge: HOME | End: 2020-06-04
Payer: MEDICARE

## 2020-06-04 VITALS — TEMPERATURE: 97.4 F | HEIGHT: 72 IN | BODY MASS INDEX: 24.37 KG/M2 | WEIGHT: 179.9 LBS

## 2020-06-04 PROCEDURE — 3017F COLORECTAL CA SCREEN DOC REV: CPT | Performed by: ORTHOPAEDIC SURGERY

## 2020-06-04 PROCEDURE — 4040F PNEUMOC VAC/ADMIN/RCVD: CPT | Performed by: ORTHOPAEDIC SURGERY

## 2020-06-04 PROCEDURE — 1123F ACP DISCUSS/DSCN MKR DOCD: CPT | Performed by: ORTHOPAEDIC SURGERY

## 2020-06-04 PROCEDURE — 97110 THERAPEUTIC EXERCISES: CPT | Performed by: PHYSICAL THERAPIST

## 2020-06-04 PROCEDURE — 97750 PHYSICAL PERFORMANCE TEST: CPT | Performed by: PHYSICAL THERAPIST

## 2020-06-04 PROCEDURE — 99213 OFFICE O/P EST LOW 20 MIN: CPT | Performed by: ORTHOPAEDIC SURGERY

## 2020-06-04 PROCEDURE — G8420 CALC BMI NORM PARAMETERS: HCPCS | Performed by: ORTHOPAEDIC SURGERY

## 2020-06-04 PROCEDURE — 97530 THERAPEUTIC ACTIVITIES: CPT | Performed by: PHYSICAL THERAPIST

## 2020-06-04 PROCEDURE — G8427 DOCREV CUR MEDS BY ELIG CLIN: HCPCS | Performed by: ORTHOPAEDIC SURGERY

## 2020-06-04 PROCEDURE — 1036F TOBACCO NON-USER: CPT | Performed by: ORTHOPAEDIC SURGERY

## 2020-06-04 NOTE — PROGRESS NOTES
None    Years of education: None    Highest education level: None   Occupational History    None   Social Needs    Financial resource strain: None    Food insecurity     Worry: None     Inability: None    Transportation needs     Medical: None     Non-medical: None   Tobacco Use    Smoking status: Never Smoker    Smokeless tobacco: Never Used   Substance and Sexual Activity    Alcohol use: Never     Frequency: Never    Drug use: Never    Sexual activity: None   Lifestyle    Physical activity     Days per week: None     Minutes per session: None    Stress: None   Relationships    Social connections     Talks on phone: None     Gets together: None     Attends Buddhism service: None     Active member of club or organization: None     Attends meetings of clubs or organizations: None     Relationship status: None    Intimate partner violence     Fear of current or ex partner: None     Emotionally abused: None     Physically abused: None     Forced sexual activity: None   Other Topics Concern    None   Social History Narrative    None       Current Outpatient Medications   Medication Sig Dispense Refill    GLUCOSAMINE-CHONDROITIN PO Take 2 tablets by mouth daily      Collagenase POWD Take by mouth daily      Multiple Vitamins-Minerals (MULTIVITAMIN PO) Take 1 tablet by mouth daily       No current facility-administered medications for this visit. No Known Allergies      Review of Systems:  A 14 point review of systems was completed by the patient and is available in the media section of the scanned medical record and was reviewed on 6/4/2020. The review is negative with the exception of those things mentioned in the HPI and Past Medical History   Review of Systems   Musculoskeletal: Negative for arthralgias and myalgias. Neurological: Positive for weakness. Negative for numbness.           Vital Signs:   Temp 97.4 °F (36.3 °C) (Infrared)   Ht 6' 0.01\" (1.829 m)   Wt 179 lb 14.3 oz (81.6 kg) BMI 24.39 kg/m²     General Exam:    General: Stefano Michele is a healthy and well appearing 76y.o. year old male who is sitting comfortably in our office in no acute distress. Neuro: Alert & Oriented x 3,  normal,  no focal deficits noted. Normal mood & affect. Eyes: Sclera clear  Ears: Normal external ear  Pulm: Respirations unlabored and regular   Skin: Warm, well perfused      Knee Examination: Left    Inspection: Well-healed surgical scar. No effusion, ecchymosis, discoloration, erythema, excessive warmth. no gross deformities, no signs of infection. Palpation: There is no patellofemoral crepitus, there is no joint line tenderness. No other osseous or soft tissue tenderness around the knee. Negative calf tenderness    Range of Motion:  0-140 degrees without pain or difficulty    Strength:  4/5 quadriceps, 4/5 hamstrings    Special Tests:   No ligament instability, Negative Homans sign    Gait:  Steady, Non antalgic, without the use of assistive devices    Alignment: Neutral    Neuro: Sensation equal bilateral lower extremities    Vascular: 2+ posterior tibialis pulse        Biodex testing conducted on 06/0/2020. Left lower extremity. Quadricep deficit 24% with hamstring deficit of 5%. Assessment: Patient is a 76 y.o. male presenting to the office for postoperative visit. Patient is approximately 5 and half months status post a left total knee arthroplasty. Encounter Diagnoses   Name Primary?  S/P total knee replacement, left Yes    Muscle weakness of lower extremity          Medical decision making:  Patient's workup and evaluation were reviewed with the patient in the office today. Patient is doing fairly well postoperatively. I believe he would benefit from further physical therapy to increase his strength. Patient lives 3 hours away and he expresses that it is not conducive to his social situation to drive 3 hours for physical therapy multiple times a week.   He was pertinent clinical information. I personally interviewed the patient and performed a physical examination and medical decision making. I discussed the patient's condition and treatment options and have  reviewed and agree with the past medical, family and social history unless otherwise noted. All of the patient's questions were answered.       Board Certified Orthopaedic Surgeon  [facility] and [address] and [address] and [employer]  Professor of [address]

## 2020-06-04 NOTE — FLOWSHEET NOTE
questions about HEP moving forward. Patient verbally stated he understood. Updated 6/4/20     Therapeutic Exercise and NMR EXR  [x] (97287) Provided verbal/tactile cueing for activities related to strengthening, flexibility, endurance, ROM for improvements in LE, proximal hip, and core control with self care, mobility, lifting, ambulation. [x] (65642) Provided verbal/tactile cueing for activities related to improving balance, coordination, kinesthetic sense, posture, motor skill, proprioception  to assist with LE, proximal hip, and core control in self care, mobility, lifting, ambulation and eccentric single leg control.      NMR and Therapeutic Activities:    [x] (24568 or 18098) Provided verbal/tactile cueing for activities related to improving balance, coordination, kinesthetic sense, posture, motor skill, proprioception and motor activation to allow for proper function of core, proximal hip and LE with self care and ADLs  [] (45451) Gait Re-education- Provided training and instruction to the patient for proper LE, core and proximal hip recruitment and positioning and eccentric body weight control with ambulation re-education including up and down stairs     Home Exercise Program:    [x] (78616) Reviewed/Progressed HEP activities related to strengthening, flexibility, endurance, ROM of core, proximal hip and LE for functional self-care, mobility, lifting and ambulation/stair navigation   [] (74477)Reviewed/Progressed HEP activities related to improving balance, coordination, kinesthetic sense, posture, motor skill, proprioception of core, proximal hip and LE for self care, mobility, lifting, and ambulation/stair navigation      Manual Treatments:  PROM / STM / Oscillations-Mobs:  G-I, II, III, IV (PA's, Inf., Post.)  [] (43674) Provided manual therapy to mobilize LE, proximal hip and/or LS spine soft tissue/joints for the purpose of modulating pain, promoting relaxation,  increasing ROM, reducing/eliminating soft tissue swelling/inflammation/restriction, improving soft tissue extensibility and allowing for proper ROM for normal function with self care, mobility, lifting and ambulation. Modalities:     Charges:  Timed Code Treatment Minutes: 39'   Total Treatment Minutes: 45'   3:05 - 4:10    [] EVAL (LOW) 03617 (typically 20 minutes face-to-face)  [] EVAL (MOD) 75468 (typically 30 minutes face-to-face)  [] EVAL (HIGH) 35189 (typically 45 minutes face-to-face)  [] RE-EVAL   [x] EZ(13241) x 1   [] IONTO  [] NMR (00775) x     [] VASO  [] Manual (37953) x 1   [x] Other: PPT x 1  [x] TA x 1     [] Mech Traction (12189)  [] ES(attended) (04535)      [] ES (un) (63143):     GOALS:   Patient stated goal: Reduce pain in knee with ADL's    [x] Progressing: [] Met: [] Not Met: [] Adjusted    Therapist goals for Patient:   Short Term Goals: To be achieved in: 2 weeks  1. Independent in HEP and progression per patient tolerance, in order to prevent re-injury. [] Progressing: [x] Met: [] Not Met: [] Adjusted   2. Patient will have a decrease in pain to facilitate improvement in movement, function, and ADLs as indicated by Functional Deficits. [] Progressing: [x] Met: [] Not Met: [] Adjusted    Long Term Goals: To be achieved in: 12 weeks  1. Disability index score of 30% or less for the LEFS to assist with reaching prior level of function. [] Progressing: [x] Met: [] Not Met: [] Adjusted  2. Patient will demonstrate increased AROM to 0-120 or greater to allow for proper joint functioning as indicated by patients Functional Deficits. [] Progressing: [x] Met: [] Not Met: [] Adjusted  3. Patient will demonstrate an increase in Strength to 5/5 in BLE to allow for proper functional mobility as indicated by patients Functional Deficits. [x] Progressing: [] Met: [] Not Met: [] Adjusted  4. Patient will return to all ADL and other functional activities without increased symptoms or restriction.    [] Progressing: [x] Met: [] Not

## 2021-02-13 NOTE — PROGRESS NOTES
Bed: ED15  Expected date:   Expected time:   Means of arrival:   Comments:  Triage. Flank pain   [facility] PRE-SURGICAL TESTING INSTRUCTIONS                              PRIOR TO PROCEDURE DATE:  1. Please follow any guidelines/instructions prior to your procedure as advised by your surgeon. 2. Arrange for someone to drive you home and be with you for the first 24 hours after discharge for your safety after your procedure for which you received sedation. Ensure it is someone we can share information with regarding your discharge. 3. You must contact your surgeon for instructions IF:   You are taking any blood thinners, aspirin, anti-inflammatory or vitamin E.   There is a change in your physical condition such as a cold, fever, rash, cuts, sores or any other infection, especially near your surgical site. 4. Do not drink alcohol the day before or day of your procedure. 5. A Pre-op History and Physical for surgery MUST be completed by your Physician or Urgent Care within 30 days of your procedure date. Please bring a copy with you on the day of your procedure and along with any other testing performed. THE DAY OF YOUR PROCEDURE:  1. Follow instructions for ARRIVAL TIME as DIRECTED BY YOUR SURGEON. I    2. Enter the MAIN entrance from [employer] and follow the signs to the free [employer] or [employer] parking (offered free of charge 6am-5pm). 3. Enter the Main Entrance of the hospital (do not enter from the lower level of the parking garage). Upon entrance, check in with the  at the main desk on your left. If no one is available at the desk, proceed into the [facility] Waiting Room and go through the door directly into the [facility]. There is a Check-in desk ACROSS from Room 5 (marked with a sign hanging from the ceiling). The phone number for the surgery center is [phone]. 4. Please call [phone] option #2 option #2 if you have not been preregistered yet. On the day of your procedure bring your insurance card and photo ID.  You will be registered at your bedside once brought back to your room. 5. DO NOT EAT ANYTHING eight hours prior to surgery. May have 8 ounces of water 4 hours prior to surgery. 6. MEDICATIONS    Take the following medications with a SMALL sip of water: none   Use your usual dose of inhalers the morning of surgery. BRING your rescue inhaler with you to hospital.    Anesthesia does NOT want you to take insulin the morning of surgery. They will control your blood sugar while you are at the hospital. Please contact your ordering physician for instructions regarding your insulin the night before your procedure. If you have an insulin pump, please keep it set on basal rate. 7. Do not swallow water when brushing teeth. No gum, candy, mints or ice chips. Refrain from smoking or at least decrease the amount. 8. Dress in loose, comfortable clothing appropriate for redressing after your procedure. Do not wear jewelry (including body piercings), make-up (especially NO eye make-up), fingernail polish (NO toenail polish if foot/leg surgery), lotion, powders or metal hairclips. 9. Dentures, glasses, or contacts will need to be removed before your procedure. Bring cases for your glasses, contacts, dentures, or hearing aids to protect them while you are in surgery. 10. If you use a CPAP, please bring it with you on the day of your procedure. 11. We recommend that valuable personal  belongings such as cash, cell phones, e-tablets or jewelry, be left at home during your stay. The hospital will not be responsible for valuables that are not secured in the hospital safe. However, if your insurance requires a co-pay, you may want to bring a method of payment, i.e. Check or credit card, if you wish to pay your co-pay the day of surgery. 12. If you are to stay overnight, you may bring a bag with personal items.  Please have any large items you may need brought in by your family after your arrival to your hospital

## 2023-10-31 NOTE — PROGRESS NOTES
LM for mom to call the office back   Spoke with wife briefly. She was unable to answer patient's history fully and states she will try to reach patient and have him call [facility] dept back. Wife did state pt started Hibiclens showers already. Called and [facility] of number listed on preop H&P re: unsigned/unconfirmed ekg. /ag      1/10/20 @ 1442 [address], returned call. Will sign/interpret ekg and refax to hospital [facility] dept.  /ag    1/13/19 pt called back. TI completed. Answered all questions. Video link emailed to pt again per request lost link and would like to watch again. EKG received from NP signed and confirmed.    Orders from surgeon rec'd and transcribed into [facility]/ag

## (undated) DEVICE — SUTURE VCRL SZ 3-0 L36IN ABSRB UD L36MM CT-1 1/2 CIR J944H

## (undated) DEVICE — SURE SET-DOUBLE BASIN-LF: Brand: MEDLINE INDUSTRIES, INC.

## (undated) DEVICE — GARMENT,MEDLINE,DVT,INT,CALF,MED, GEN2: Brand: MEDLINE

## (undated) DEVICE — SYRINGE 60ML IRRIG PISTON TOMEY

## (undated) DEVICE — DECANTER BAG 9": Brand: MEDLINE INDUSTRIES, INC.

## (undated) DEVICE — SUTURE VCRL UD BR CT 3-0 18IN CT1 J838D

## (undated) DEVICE — STRIP,CLOSURE,WOUND,MEDI-STRIP,1/2X4: Brand: MEDLINE

## (undated) DEVICE — 60 ML SYRINGE,CATHETER TIP: Brand: MONOJECT

## (undated) DEVICE — SOLUTION IV IRRIG WATER 1000ML POUR BRL 2F7114

## (undated) DEVICE — COUNTER NDL 40 COUNT HLD 70 NUM FOAM BLK SGL MAG W BLDE REMV

## (undated) DEVICE — Z DISCONTINUED PER MEDLINE USE 2741943 DRESSING AQUACEL 10 IN ALG W9XL25CM SIL CVR WTRPRF VIR BACT BARR ANTIMIC

## (undated) DEVICE — GOWN,SIRUS,POLYRNF,BRTHSLV,XL,30/CS: Brand: MEDLINE

## (undated) DEVICE — SUTURE ETHBND EXCEL SZ 0 L18IN NONABSORBABLE GRN L36MM CT-1 CX21D

## (undated) DEVICE — SYRINGE, LUER LOCK, 10ML: Brand: MEDLINE

## (undated) DEVICE — PLATE ES AD W 9FT CRD 2

## (undated) DEVICE — FLUID TRAP FOR MINIVAC ES EQUIP FLD TRAP

## (undated) DEVICE — ORTHO PRE OP PACK: Brand: MEDLINE INDUSTRIES, INC.

## (undated) DEVICE — PENCIL ES ULT VAC W TELSCP NOSE EZ CLN BLDE 10FT

## (undated) DEVICE — 3 BONE CEMENT MIXER: Brand: MIXEVAC

## (undated) DEVICE — 450 ML BOTTLE OF 0.05% CHLORHEXIDINE GLUCONATE IN 99.95% STERILE WATER FOR IRRIGATION, USP AND APPLICATOR.: Brand: IRRISEPT ANTIMICROBIAL WOUND LAVAGE

## (undated) DEVICE — HIGH FLOW TIP

## (undated) DEVICE — STERILE POLYISOPRENE POWDER-FREE SURGICAL GLOVES WITH EMOLLIENT COATING: Brand: PROTEXIS

## (undated) DEVICE — SPONGE,LAP,18"X18",DLX,XR,ST,5/PK,40/PK: Brand: MEDLINE

## (undated) DEVICE — YANKAUER,OPEN TIP,W/O VENT,STERILE: Brand: MEDLINE INDUSTRIES, INC.

## (undated) DEVICE — COVER LT HNDL BLU PLAS

## (undated) DEVICE — 2108 SERIES SAGITTAL BLADE (19.5 X 1.27 X 90.0MM)

## (undated) DEVICE — SST TWIST DRILL, STANDARD, 3.2MM DIA. X 127MM: Brand: MICROAIRE®

## (undated) DEVICE — CHLORAPREP 26ML ORANGE

## (undated) DEVICE — SPLINT KNEE UNIV FOR LESS THAN 36IN L24IN FOAM LAM E CNTCT

## (undated) DEVICE — PACK PROCEDURE SURG TOTAL KNEE

## (undated) DEVICE — SUTURE MCRYL SZ 4-0 L27IN ABSRB UD L19MM PS-2 1/2 CIR PRIM Y426H

## (undated) DEVICE — GLOVE SURG SZ 9 L12IN FNGR THK87MIL DK GRN LTX POLYMER W

## (undated) DEVICE — GLOVE SURG SZ 7 L12IN FNGR THK87MIL WHT LTX FREE

## (undated) DEVICE — SOLUTION IV 100ML 0.9% SOD CHL PH5 CONTAIN DEHP VIAFLX QP

## (undated) DEVICE — RIMMED SPEED PIN 45MM STERILE

## (undated) DEVICE — HANDPIECE SUCTION TUBING INTERPULSE 10FT

## (undated) DEVICE — Z INACTIVE NO SUPPLIER SOLUTIONIRRIG 3000ML 0.9% SOD CHL FLX CONT [79720808] [HOSPIRA WORLDWIDE INC]

## (undated) DEVICE — INTENDED FOR TISSUE SEPARATION, AND OTHER PROCEDURES THAT REQUIRE A SHARP SURGICAL BLADE TO PUNCTURE OR CUT.: Brand: BARD-PARKER ® CARBON RIB-BACK BLADES

## (undated) DEVICE — SST BUR, WIRE PASS DRILL, 2 FLUTES, MED., 2MM DIA.: Brand: MICROAIRE®

## (undated) DEVICE — SUTURE VCRL SZ 0 L18IN ABSRB UD L36MM CT-1 1/2 CIR J840D

## (undated) DEVICE — JEWISH HOSPITAL TURNOVER KIT: Brand: MEDLINE INDUSTRIES, INC.

## (undated) DEVICE — 3M™ COBAN™ NL STERILE NON-LATEX SELF-ADHERENT WRAP, 2086S, 6 IN X 5 YD (15 CM X 4,5 M), 12 ROLLS/CASE: Brand: 3M™ COBAN™

## (undated) DEVICE — NON STERILE VISIONAIRE ADAPTIVE                                    GUIDE KIT JOURNEY II: Brand: VISIONAIRE

## (undated) DEVICE — DUAL CUT SAGITTAL BLADE

## (undated) DEVICE — SOLUTION IV 1000ML 0.9% SOD CHL